# Patient Record
Sex: MALE | Race: BLACK OR AFRICAN AMERICAN | Employment: OTHER | URBAN - METROPOLITAN AREA
[De-identification: names, ages, dates, MRNs, and addresses within clinical notes are randomized per-mention and may not be internally consistent; named-entity substitution may affect disease eponyms.]

---

## 2020-07-08 ENCOUNTER — APPOINTMENT (OUTPATIENT)
Dept: GENERAL RADIOLOGY | Age: 70
End: 2020-07-08
Payer: COMMERCIAL

## 2020-07-08 ENCOUNTER — HOSPITAL ENCOUNTER (EMERGENCY)
Age: 70
Discharge: HOME | End: 2020-07-08
Payer: COMMERCIAL

## 2020-07-08 ENCOUNTER — APPOINTMENT (OUTPATIENT)
Dept: CT IMAGING | Age: 70
End: 2020-07-08
Payer: COMMERCIAL

## 2020-07-08 VITALS
RESPIRATION RATE: 19 BRPM | DIASTOLIC BLOOD PRESSURE: 84 MMHG | HEIGHT: 72 IN | OXYGEN SATURATION: 99 % | TEMPERATURE: 98.8 F | HEART RATE: 68 BPM | WEIGHT: 188.71 LBS | BODY MASS INDEX: 25.56 KG/M2 | SYSTOLIC BLOOD PRESSURE: 157 MMHG

## 2020-07-08 LAB
A/G RATIO: 0.7 (ref 1.1–2.2)
ALBUMIN SERPL-MCNC: 3.6 G/DL (ref 3.4–5)
ALP BLD-CCNC: 147 U/L (ref 40–129)
ALT SERPL-CCNC: 22 U/L (ref 10–40)
AMMONIA: 47 UMOL/L (ref 16–60)
ANION GAP SERPL CALCULATED.3IONS-SCNC: 12 MMOL/L (ref 3–16)
AST SERPL-CCNC: 44 U/L (ref 15–37)
BACTERIA: ABNORMAL /HPF
BASOPHILS ABSOLUTE: 0.1 K/UL (ref 0–0.2)
BASOPHILS RELATIVE PERCENT: 1.3 %
BILIRUB SERPL-MCNC: 0.4 MG/DL (ref 0–1)
BILIRUBIN URINE: NEGATIVE
BLOOD, URINE: NEGATIVE
BUN BLDV-MCNC: 12 MG/DL (ref 7–20)
CALCIUM SERPL-MCNC: 9 MG/DL (ref 8.3–10.6)
CHLORIDE BLD-SCNC: 98 MMOL/L (ref 99–110)
CLARITY: ABNORMAL
CO2: 21 MMOL/L (ref 21–32)
COLOR: ABNORMAL
CREAT SERPL-MCNC: 1.4 MG/DL (ref 0.8–1.3)
EKG ATRIAL RATE: 79 BPM
EKG DIAGNOSIS: NORMAL
EKG P AXIS: 59 DEGREES
EKG P-R INTERVAL: 176 MS
EKG Q-T INTERVAL: 424 MS
EKG QRS DURATION: 138 MS
EKG QTC CALCULATION (BAZETT): 486 MS
EKG R AXIS: -73 DEGREES
EKG T AXIS: 10 DEGREES
EKG VENTRICULAR RATE: 79 BPM
EOSINOPHILS ABSOLUTE: 0 K/UL (ref 0–0.6)
EOSINOPHILS RELATIVE PERCENT: 0.1 %
EPITHELIAL CELLS, UA: 1 /HPF (ref 0–5)
FINE CASTS, UA: ABNORMAL /LPF (ref 0–2)
GFR AFRICAN AMERICAN: >60
GFR NON-AFRICAN AMERICAN: 50
GLOBULIN: 5 G/DL
GLUCOSE BLD-MCNC: 102 MG/DL (ref 70–99)
GLUCOSE URINE: NEGATIVE MG/DL
HCT VFR BLD CALC: 38.5 % (ref 40.5–52.5)
HEMOGLOBIN: 12.7 G/DL (ref 13.5–17.5)
HYALINE CASTS: ABNORMAL /LPF (ref 0–2)
INR BLD: 1.22 (ref 0.86–1.14)
KETONES, URINE: ABNORMAL MG/DL
LACTIC ACID: 1.2 MMOL/L (ref 0.4–2)
LEUKOCYTE ESTERASE, URINE: NEGATIVE
LIPASE: 33 U/L (ref 13–60)
LYMPHOCYTES ABSOLUTE: 1.1 K/UL (ref 1–5.1)
LYMPHOCYTES RELATIVE PERCENT: 22.9 %
MAGNESIUM: 2.1 MG/DL (ref 1.8–2.4)
MCH RBC QN AUTO: 27.1 PG (ref 26–34)
MCHC RBC AUTO-ENTMCNC: 32.9 G/DL (ref 31–36)
MCV RBC AUTO: 82.3 FL (ref 80–100)
MICROSCOPIC EXAMINATION: YES
MONOCYTES ABSOLUTE: 1 K/UL (ref 0–1.3)
MONOCYTES RELATIVE PERCENT: 21.1 %
NEUTROPHILS ABSOLUTE: 2.6 K/UL (ref 1.7–7.7)
NEUTROPHILS RELATIVE PERCENT: 54.6 %
NITRITE, URINE: NEGATIVE
PDW BLD-RTO: 16.2 % (ref 12.4–15.4)
PH UA: 5.5 (ref 5–8)
PLATELET # BLD: 274 K/UL (ref 135–450)
PMV BLD AUTO: 7.9 FL (ref 5–10.5)
POTASSIUM SERPL-SCNC: 4 MMOL/L (ref 3.5–5.1)
PRO-BNP: 49 PG/ML (ref 0–124)
PROTEIN UA: 100 MG/DL
PROTHROMBIN TIME: 14.2 SEC (ref 10–13.2)
RBC # BLD: 4.68 M/UL (ref 4.2–5.9)
RBC UA: 5 /HPF (ref 0–4)
SODIUM BLD-SCNC: 131 MMOL/L (ref 136–145)
SPECIFIC GRAVITY UA: 1.02 (ref 1–1.03)
TOTAL PROTEIN: 8.6 G/DL (ref 6.4–8.2)
TROPONIN: <0.01 NG/ML
URINE REFLEX TO CULTURE: ABNORMAL
URINE TYPE: ABNORMAL
UROBILINOGEN, URINE: 0.2 E.U./DL
WBC # BLD: 4.7 K/UL (ref 4–11)
WBC UA: 1 /HPF (ref 0–5)

## 2020-07-08 PROCEDURE — 85610 PROTHROMBIN TIME: CPT

## 2020-07-08 PROCEDURE — 83880 ASSAY OF NATRIURETIC PEPTIDE: CPT

## 2020-07-08 PROCEDURE — 81001 URINALYSIS AUTO W/SCOPE: CPT

## 2020-07-08 PROCEDURE — 93005 ELECTROCARDIOGRAM TRACING: CPT | Performed by: PHYSICIAN ASSISTANT

## 2020-07-08 PROCEDURE — 83605 ASSAY OF LACTIC ACID: CPT

## 2020-07-08 PROCEDURE — 71045 X-RAY EXAM CHEST 1 VIEW: CPT

## 2020-07-08 PROCEDURE — 84484 ASSAY OF TROPONIN QUANT: CPT

## 2020-07-08 PROCEDURE — 70450 CT HEAD/BRAIN W/O DYE: CPT

## 2020-07-08 PROCEDURE — 83735 ASSAY OF MAGNESIUM: CPT

## 2020-07-08 PROCEDURE — 93010 ELECTROCARDIOGRAM REPORT: CPT | Performed by: INTERNAL MEDICINE

## 2020-07-08 PROCEDURE — 83690 ASSAY OF LIPASE: CPT

## 2020-07-08 PROCEDURE — 99285 EMERGENCY DEPT VISIT HI MDM: CPT

## 2020-07-08 PROCEDURE — 36415 COLL VENOUS BLD VENIPUNCTURE: CPT

## 2020-07-08 PROCEDURE — 80053 COMPREHEN METABOLIC PANEL: CPT

## 2020-07-08 PROCEDURE — 85025 COMPLETE CBC W/AUTO DIFF WBC: CPT

## 2020-07-08 PROCEDURE — 87040 BLOOD CULTURE FOR BACTERIA: CPT

## 2020-07-08 PROCEDURE — 82140 ASSAY OF AMMONIA: CPT

## 2020-07-08 RX ORDER — BISACODYL 10 MG
10 SUPPOSITORY, RECTAL RECTAL DAILY PRN
COMMUNITY

## 2020-07-08 RX ORDER — CHLORPROMAZINE HYDROCHLORIDE 25 MG/1
1 TABLET, FILM COATED ORAL 4 TIMES DAILY
COMMUNITY
Start: 2020-06-24

## 2020-07-08 RX ORDER — PANTOPRAZOLE SODIUM 20 MG/1
1 TABLET, DELAYED RELEASE ORAL DAILY
COMMUNITY
Start: 2020-07-06

## 2020-07-08 RX ORDER — SODIUM PHOSPHATE, DIBASIC AND SODIUM PHOSPHATE, MONOBASIC 7; 19 G/133ML; G/133ML
1 ENEMA RECTAL DAILY PRN
COMMUNITY

## 2020-07-08 RX ORDER — 0.9 % SODIUM CHLORIDE 0.9 %
500 INTRAVENOUS SOLUTION INTRAVENOUS ONCE
Status: DISCONTINUED | OUTPATIENT
Start: 2020-07-08 | End: 2020-07-08 | Stop reason: HOSPADM

## 2020-07-08 RX ORDER — ACETAMINOPHEN 325 MG/1
650 TABLET ORAL EVERY 6 HOURS PRN
COMMUNITY

## 2020-07-08 RX ORDER — LACTULOSE 10 G/15ML
30 SOLUTION ORAL 4 TIMES DAILY
COMMUNITY
Start: 2020-07-05

## 2020-07-08 NOTE — ED PROVIDER NOTES
SCREENINGS             PHYSICAL EXAM    (up to 7 for level 4, 8 or more for level 5)     ED Triage Vitals   BP Temp Temp src Pulse Resp SpO2 Height Weight   -- -- -- -- -- -- -- --       Physical Exam  Vitals signs and nursing note reviewed. Constitutional:       Appearance: Normal appearance. He is well-developed and normal weight. Comments: Patient easily awakens. States he has no physical complaints. States he is a bit tired. He does mentate clearly for me. He is unaware as to exact date or time. This given the fact that he lives in an [facility] this could be explained or understandable. He also has known dementia and cognitive impairment due to EtOH abuse. HENT:      Head: Normocephalic and atraumatic. Right Ear: External ear normal.      Left Ear: External ear normal.   Eyes:      General: No scleral icterus. Right eye: No discharge. Left eye: No discharge. Conjunctiva/sclera: Conjunctivae normal.   Neck:      Musculoskeletal: Normal range of motion and neck supple. Cardiovascular:      Rate and Rhythm: Normal rate and regular rhythm. Heart sounds: Normal heart sounds. Pulmonary:      Effort: Pulmonary effort is normal.      Breath sounds: Normal breath sounds. Abdominal:      General: Abdomen is flat. Bowel sounds are normal.      Palpations: Abdomen is soft. Tenderness: There is no abdominal tenderness. Musculoskeletal: Normal range of motion. Skin:     General: Skin is warm and dry. Neurological:      General: No focal deficit present. Mental Status: He is alert and oriented to person, place, and time. Mental status is at baseline.    Psychiatric:         Mood and Affect: Mood normal.         Behavior: Behavior normal.         DIAGNOSTIC RESULTS   LABS:    Labs Reviewed   CBC WITH AUTO DIFFERENTIAL - Abnormal; Notable for the following components:       Result Value    Hemoglobin 12.7 (*)     Hematocrit 38.5 (*)     RDW 16.2 (*) All other components within normal limits    Narrative:     Performed at:  [facility]  [address] [facility] [place] [address]   Phone [phone]   COMPREHENSIVE METABOLIC PANEL - Abnormal; Notable for the following components:    Sodium 131 (*)     Chloride 98 (*)     Glucose 102 (*)     CREATININE 1.4 (*)     GFR Non- 50 (*)     Total Protein 8.6 (*)     Albumin/Globulin Ratio 0.7 (*)     Alkaline Phosphatase 147 (*)     AST 44 (*)     All other components within normal limits    Narrative:     Performed at:  [address] [address]   Phone [phone]   URINE RT REFLEX TO CULTURE - Abnormal; Notable for the following components:    Clarity, UA CLOUDY (*)     Ketones, Urine TRACE (*)     Protein,  (*)     All other components within normal limits    Narrative:     Performed at:  [address] [employer]   Phone [phone]   PROTIME-INR - Abnormal; Notable for the following components:    Protime 14.2 (*)     INR 1.22 (*)     All other components within normal limits    Narrative:     Performed at:  [address] [employer]   Phone [phone]   MICROSCOPIC URINALYSIS - Abnormal; Notable for the following components:    Hyaline Casts, UA 11-20 (*)     Fine Casts, UA 11-20 (*)     Bacteria, UA 4+ (*)     RBC, UA 5 (*)     All other components within normal limits    Narrative:     Performed at:  [address] [place] [address]   Phone [phone]   CULTURE, BLOOD 2    Narrative:     ORDER#: [id number]                          ORDERED BY: Adeel Huynh  SOURCE: Blood                              COLLECTED:  07/08/20 10:05  ANTIBIOTICS AT TRICIA.:                      RECEIVED :  07/08/20 10:18  If child <=2 yrs old please draw pediatric bottle. ~Blood Culture #2  Performed at:  [facility]  [address] [place] [address]   Phone [phone]   CULTURE, BLOOD 1    Narrative:     ORDER#: [id number]                          ORDERED BY: Manoj Lane  SOURCE: Blood                              COLLECTED:  07/08/20 09:30  ANTIBIOTICS AT TRICIA.:                      RECEIVED :  07/08/20 09:38  If child <=2 yrs old please draw pediatric bottle. ~Blood Culture #1  Performed at:  [facility]  [address] [place], [place] [address]   Phone [phone]   CULTURE, URINE   LIPASE    Narrative:     Performed at:  [facility]  [address] [facility] [place] [address]   Phone [phone]   TROPONIN    Narrative:     Performed at:  [facility]  [address] [facility] [place] [address]   Phone [phone]   BRAIN NATRIURETIC PEPTIDE    Narrative:     Performed at:  [facility]  [address] [facility] [place] [address]   Phone [phone]   MAGNESIUM    Narrative:     Performed at:  [facility]  [address] [facility] [place] [address]   Phone [phone]   LACTIC ACID, PLASMA    Narrative:     Performed at:  [facility]  [address] [place], [place] [address]   Phone [phone]   AMMONIA    Narrative:     Performed at:  [facility]  [address] [facility] [place] [address]   Phone [phone]       All other labs were within normal range or not returned as of this dictation. EKG: All EKG's are interpreted by the Emergency Department Physician in the absence of a cardiologist.  Please see their note for interpretation of EKG.       RADIOLOGY:   Non-plain film images such as CT, Ultrasound and MRI are read by the radiologist. Sangeeta Chen radiographic images are visualized and preliminarily interpreted by the ED Provider with the below findings:        Interpretation per the Radiologist below, if available at the time of this note:    XR CHEST PORTABLE   Final Result   No acute process. CT Head WO Contrast   Final Result   1. No evidence of acute intracranial abnormality. 2. Abnormal lucency surrounding root of left maxillary molar with focal   disruption of adjacent cortical bone along the floor of the left maxillary   sinus. Periodontal disease and dentigerous cyst are in the differential   diagnosis. No results found. PROCEDURES   Unless otherwise noted below, none     Procedures    CRITICAL CARE TIME   N/A    CONSULTS:  None      EMERGENCY DEPARTMENT COURSE and DIFFERENTIAL DIAGNOSIS/MDM:   Vitals:    Vitals:    07/08/20 1200 07/08/20 1445 07/08/20 1530 07/08/20 1735   BP:  (!) 157/94  (!) 157/84   Pulse: 65 67 67 68   Resp: 19 17 19    Temp:       TempSrc:       SpO2:       Weight:       Height:           Patient was given the following medications:  Medications - No data to display        Patient presenting by EMS from [facility] with altered mental status. Patient remained aware and alert throughout his ED visit. I do suspect his altered mental status this morning as noted by the care facility is related to his recent Thorazine increase from 10 mg 4 times daily up to 25 mg 4 times daily. We did obtain laboratory studies, chest x-ray, CT scans and UA and these were all normal within range. He did not receive any medication while here in the emergency room. Patient is able to return back to the [facility] without prescriptions. The patient does express understanding of his diagnosis and the treatment plan. FINAL IMPRESSION      1.  Altered mental status, unspecified altered mental status type          DISPOSITION/PLAN   DISPOSITION Decision To Discharge 07/08/2020 07:02:14 PM      PATIENT REFERREDTO:  No follow-up provider specified. DISCHARGE MEDICATIONS:  Discharge Medication List as of 7/8/2020  6:59 PM          DISCONTINUED MEDICATIONS:  Discharge Medication List as of 7/8/2020  6:59 PM                 (Please note that portions of this note were completed with a voice recognition program.  Efforts were made to edit the dictations but occasionally words are mis-transcribed. )    Mi Gonzalez PA-C (electronically signed)           Mi Gonzalez PA-C  07/09/20 2176

## 2020-07-08 NOTE — ED NOTES
Bed: B-05  Expected date:   Expected time:   Means of arrival:   Comments:  70M  Altered mental status       Teresa Martinez RN  07/08/20 8861

## 2020-07-08 NOTE — ED NOTES
Pt sleeping at present  When awaken offered drink refused  Up dated     [facility]  07/08/20 1543

## 2020-07-08 NOTE — ED PROVIDER NOTES
[address] [facility]      Pt Name: Larissa Em  MRN: [id number]  [place] 1950  Date of evaluation: 7/8/2020  Provider: Merlene Mead DO    CHIEF COMPLAINT  Chief Complaint   Patient presents with    Altered Mental Status     Patient sent in from [facility] because staff had trouble waking him up this morning. Patient has dementia and is alert to person and place this morning. Patient        I have fully participated in the care of Larissa Em and have had a face-to-face evaluation. I have reviewed and agree with all pertinent clinical information, and midlevel provider's history, and physical exam. I have also reviewed the labs, EKG, and imaging studies and treatment plan. I have also reviewed and agree with the medications, allergies and past medical history section for this Larissa Em. I agree with the diagnosis, and I concur. I wore personal protective equipment when I was in the room the entire time. This includes gloves, N95 mask, face shield, and a glove over my stethoscope for protection. Past Medical History:   Diagnosis Date    Alcohol dependence with alcohol-induced persisting dementia (HCC)     Alcoholic cirrhosis of liver with ascites (HCC)     Alcoholic hepatitis without ascites     Anemia     Anxiety     Chronic hepatitis in viral disease ([address]     Cognitive communication deficit     Coordination abnormal     Dementia ([address]     GERD (gastroesophageal reflux disease)     Hypertension     Malnutrition ([facility]        MDM:  Larissa Em is a 79 y.o. male who presents with altered mental status with a history of alcoholic cirrhosis and hepatitis. He has a history of hepatic encephalopathy. Patient is confused has no other complaints. Physical exam reveals moderate confusion. It has been present for 24 hours according to the ECF.   His exam is nonfocal.  He has had similar episodes with hepatic encephalopathy. Laboratory work revealed a urinary tract infection. He also had a sodium 131 with a GFR of 50 and mildly elevated liver functions. Patient was back to his baseline at discharge. He was discharged to follow with his doctor in 3 to 5 days and return if any problems.     Vitals:    07/08/20 1735   BP: (!) 157/84   Pulse: 68   Resp:    Temp:    SpO2:        Lab results  Labs Reviewed   CBC WITH AUTO DIFFERENTIAL - Abnormal; Notable for the following components:       Result Value    Hemoglobin 12.7 (*)     Hematocrit 38.5 (*)     RDW 16.2 (*)     All other components within normal limits    Narrative:     Performed at:  [address] [employer]   Phone [phone]   COMPREHENSIVE METABOLIC PANEL - Abnormal; Notable for the following components:    Sodium 131 (*)     Chloride 98 (*)     Glucose 102 (*)     CREATININE 1.4 (*)     GFR Non- 50 (*)     Total Protein 8.6 (*)     Albumin/Globulin Ratio 0.7 (*)     Alkaline Phosphatase 147 (*)     AST 44 (*)     All other components within normal limits    Narrative:     Performed at:  [address] [facility] [place] [address]   Phone [phone]   URINE RT REFLEX TO CULTURE - Abnormal; Notable for the following components:    Clarity, UA CLOUDY (*)     Ketones, Urine TRACE (*)     Protein,  (*)     All other components within normal limits    Narrative:     Performed at:  [facility]  [employer] [place] [facility] [place] [address]   Phone [phone]   PROTIME-INR - Abnormal; Notable for the following components:    Protime 14.2 (*)     INR 1.22 (*)     All other components within normal limits    Narrative:     Performed at:  [facility]  [employer] [place] [place] [facility] [place] [address]   Phone [phone]   MICROSCOPIC URINALYSIS - Abnormal; Notable for the following components:    Hyaline Casts, UA 11-20 (*)     Fine Casts, UA 11-20 (*)     Bacteria, UA 4+ (*)     RBC, UA 5 (*)     All other components within normal limits    Narrative:     Performed at:  [facility]  [address] [facility] [place] [address]   Phone [phone]   CULTURE, BLOOD 2    Narrative:     ORDER#: [id number]                          ORDERED BY: Amy Zapata  SOURCE: Blood                              COLLECTED:  07/08/20 10:05  ANTIBIOTICS AT TRICIA.:                      RECEIVED :  07/08/20 10:18  If child <=2 yrs old please draw pediatric bottle. ~Blood Culture #2  Performed at:  [facility]  [address] [facility] [place] [address]   Phone [phone]   CULTURE, BLOOD 1    Narrative:     ORDER#: [id number]                          ORDERED BY: Amy Zapata  SOURCE: Blood                              COLLECTED:  07/08/20 09:30  ANTIBIOTICS AT TRICIA.:                      RECEIVED :  07/08/20 09:38  If child <=2 yrs old please draw pediatric bottle. ~Blood Culture #1  Performed at:  [facility]  [address] [place], [place] [address]   Phone [phone]   CULTURE, URINE   LIPASE    Narrative:     Performed at:  [facility]  [address] [facility] [place] [address]   Phone [phone]   TROPONIN    Narrative:     Performed at:  [facility] [facility]  [address] [facility] [place] [address]   Phone [phone]   BRAIN NATRIURETIC PEPTIDE    Narrative:     Performed at:  [facility]  [address] [facility] [place] [address]   Phone [phone]   MAGNESIUM    Narrative:     Performed at:  [facility] [employer] [facility]  [address] [facility] [place] [address]   Phone [phone]   LACTIC ACID, PLASMA    Narrative:     Performed at:  [facility]  [address] [place], [place] [address]   Phone [phone]   AMMONIA    Narrative:     Performed at:  [facility]  [address] [place], [place] [address]   Phone [phone]       EKG Results  EKG Interpretation    Interpreted by emergency department physician  Time read: 4812    Rhythm: Sinus  Ventricular Rate: 79  QRS Axis: -73  Ectopy: None  Conduction: Sinus rhythm with left anterior fascicular block and right bundle branch block, left axis deviation and LVH  ST Segments: Consistent with bifascicular block  T Waves: Consistent with bifascicular block  Q Waves: None noted    Other findings: Electrical interference    Compared to EKG on: None to compare    Clinical Impression: Sinus rhythm with left anterior fascicular block and right bundle branch block with ST-T wave changes consistent with bifascicular block. There is also left axis deviation and LVH. [address]      Radiology results  XR CHEST PORTABLE   Final Result   No acute process. CT Head WO Contrast   Final Result   1. No evidence of acute intracranial abnormality. 2. Abnormal lucency surrounding root of left maxillary molar with focal   disruption of adjacent cortical bone along the floor of the left maxillary   sinus. Periodontal disease and dentigerous cyst are in the differential   diagnosis. See discharge instructions for specific medications, discharge information, and treatments. They were verbally instructed to return to emergency if any problems. Medications - No data to display    Discharge Medication List as of 7/8/2020  6:59 PM          The patient's blood pressure was not found to be elevated according to CMS/Medicare and the Affordable Care Act/ObamaCare criteria. IMPRESSIONS:  1.  Altered mental status, unspecified altered mental status type                 Myriam Talavera DO  07/12/20 0591

## 2020-07-12 LAB
BLOOD CULTURE, ROUTINE: NORMAL
CULTURE, BLOOD 2: NORMAL

## 2020-07-24 ENCOUNTER — APPOINTMENT (OUTPATIENT)
Dept: GENERAL RADIOLOGY | Age: 70
DRG: 177 | End: 2020-07-24
Payer: COMMERCIAL

## 2020-07-24 ENCOUNTER — APPOINTMENT (OUTPATIENT)
Dept: CT IMAGING | Age: 70
DRG: 177 | End: 2020-07-24
Payer: COMMERCIAL

## 2020-07-24 ENCOUNTER — HOSPITAL ENCOUNTER (INPATIENT)
Age: 70
LOS: 6 days | Discharge: NURSING, REHAB OR LONG-TERM CARE | DRG: 177 | End: 2020-07-30
Attending: STUDENT IN AN ORGANIZED HEALTH CARE EDUCATION/TRAINING PROGRAM | Admitting: HOSPITALIST
Payer: COMMERCIAL

## 2020-07-24 PROBLEM — R41.82 CHANGE IN MENTAL STATUS: Status: ACTIVE | Noted: 2020-07-24

## 2020-07-24 LAB
A/G RATIO: 0.5 (ref 1.1–2.2)
ALBUMIN SERPL-MCNC: 3.5 G/DL (ref 3.4–5)
ALP BLD-CCNC: 105 U/L (ref 40–129)
ALT SERPL-CCNC: 24 U/L (ref 10–40)
AMMONIA: 44 UMOL/L (ref 16–60)
ANION GAP SERPL CALCULATED.3IONS-SCNC: 15 MMOL/L (ref 3–16)
AST SERPL-CCNC: 55 U/L (ref 15–37)
BASE EXCESS VENOUS: -2.6 MMOL/L
BASOPHILS ABSOLUTE: 0 K/UL (ref 0–0.2)
BASOPHILS RELATIVE PERCENT: 0.2 %
BILIRUB SERPL-MCNC: 0.7 MG/DL (ref 0–1)
BILIRUBIN URINE: NEGATIVE
BLOOD, URINE: NEGATIVE
BUN BLDV-MCNC: 47 MG/DL (ref 7–20)
CALCIUM SERPL-MCNC: 9.2 MG/DL (ref 8.3–10.6)
CARBOXYHEMOGLOBIN: 0.6 %
CHLORIDE BLD-SCNC: 118 MMOL/L (ref 99–110)
CLARITY: CLEAR
CO2: 19 MMOL/L (ref 21–32)
COLOR: ABNORMAL
CREAT SERPL-MCNC: 1.3 MG/DL (ref 0.8–1.3)
EOSINOPHILS ABSOLUTE: 0.1 K/UL (ref 0–0.6)
EOSINOPHILS RELATIVE PERCENT: 0.9 %
EPITHELIAL CELLS, UA: 0 /HPF (ref 0–5)
GFR AFRICAN AMERICAN: >60
GFR NON-AFRICAN AMERICAN: 55
GLOBULIN: 6.5 G/DL
GLUCOSE BLD-MCNC: 112 MG/DL (ref 70–99)
GLUCOSE URINE: NEGATIVE MG/DL
HCO3 VENOUS: 21 MMOL/L (ref 23–29)
HCT VFR BLD CALC: 43.2 % (ref 40.5–52.5)
HEMOGLOBIN: 13.7 G/DL (ref 13.5–17.5)
HYALINE CASTS: 3 /LPF (ref 0–8)
KETONES, URINE: NEGATIVE MG/DL
LEUKOCYTE ESTERASE, URINE: NEGATIVE
LYMPHOCYTES ABSOLUTE: 0.9 K/UL (ref 1–5.1)
LYMPHOCYTES RELATIVE PERCENT: 10.8 %
MAGNESIUM: 3.6 MG/DL (ref 1.8–2.4)
MCH RBC QN AUTO: 27.3 PG (ref 26–34)
MCHC RBC AUTO-ENTMCNC: 31.7 G/DL (ref 31–36)
MCV RBC AUTO: 86 FL (ref 80–100)
METHEMOGLOBIN VENOUS: 0.3 %
MICROSCOPIC EXAMINATION: YES
MONOCYTES ABSOLUTE: 0.8 K/UL (ref 0–1.3)
MONOCYTES RELATIVE PERCENT: 9.5 %
NEUTROPHILS ABSOLUTE: 6.8 K/UL (ref 1.7–7.7)
NEUTROPHILS RELATIVE PERCENT: 78.6 %
NITRITE, URINE: NEGATIVE
O2 CONTENT, VEN: 18 ML/DL
O2 SAT, VEN: 94 %
O2 THERAPY: ABNORMAL
PCO2, VEN: 33.3 MMHG (ref 40–50)
PDW BLD-RTO: 15.8 % (ref 12.4–15.4)
PH UA: 5 (ref 5–8)
PH VENOUS: 7.41 (ref 7.35–7.45)
PHOSPHORUS: 3.6 MG/DL (ref 2.5–4.9)
PLATELET # BLD: 473 K/UL (ref 135–450)
PMV BLD AUTO: 7.9 FL (ref 5–10.5)
PO2, VEN: 69 MMHG
POTASSIUM REFLEX MAGNESIUM: 4.8 MMOL/L (ref 3.5–5.1)
PROCALCITONIN: 0.14 NG/ML (ref 0–0.15)
PROTEIN UA: 30 MG/DL
RBC # BLD: 5.03 M/UL (ref 4.2–5.9)
RBC UA: 2 /HPF (ref 0–4)
SODIUM BLD-SCNC: 152 MMOL/L (ref 136–145)
SPECIFIC GRAVITY UA: 1.02 (ref 1–1.03)
TCO2 CALC VENOUS: 22 MMOL/L
TOTAL PROTEIN: 10 G/DL (ref 6.4–8.2)
TROPONIN: <0.01 NG/ML
URINE REFLEX TO CULTURE: ABNORMAL
URINE TYPE: ABNORMAL
UROBILINOGEN, URINE: 0.2 E.U./DL
WBC # BLD: 8.6 K/UL (ref 4–11)
WBC UA: 1 /HPF (ref 0–5)

## 2020-07-24 PROCEDURE — 2580000003 HC RX 258: Performed by: HOSPITALIST

## 2020-07-24 PROCEDURE — 70450 CT HEAD/BRAIN W/O DYE: CPT

## 2020-07-24 PROCEDURE — 82140 ASSAY OF AMMONIA: CPT

## 2020-07-24 PROCEDURE — 2580000003 HC RX 258: Performed by: STUDENT IN AN ORGANIZED HEALTH CARE EDUCATION/TRAINING PROGRAM

## 2020-07-24 PROCEDURE — 80053 COMPREHEN METABOLIC PANEL: CPT

## 2020-07-24 PROCEDURE — 81001 URINALYSIS AUTO W/SCOPE: CPT

## 2020-07-24 PROCEDURE — 83735 ASSAY OF MAGNESIUM: CPT

## 2020-07-24 PROCEDURE — 96360 HYDRATION IV INFUSION INIT: CPT

## 2020-07-24 PROCEDURE — 6370000000 HC RX 637 (ALT 250 FOR IP): Performed by: HOSPITALIST

## 2020-07-24 PROCEDURE — 99285 EMERGENCY DEPT VISIT HI MDM: CPT

## 2020-07-24 PROCEDURE — 82803 BLOOD GASES ANY COMBINATION: CPT

## 2020-07-24 PROCEDURE — 36415 COLL VENOUS BLD VENIPUNCTURE: CPT

## 2020-07-24 PROCEDURE — 84484 ASSAY OF TROPONIN QUANT: CPT

## 2020-07-24 PROCEDURE — 85025 COMPLETE CBC W/AUTO DIFF WBC: CPT

## 2020-07-24 PROCEDURE — 84145 PROCALCITONIN (PCT): CPT

## 2020-07-24 PROCEDURE — 84100 ASSAY OF PHOSPHORUS: CPT

## 2020-07-24 PROCEDURE — 71045 X-RAY EXAM CHEST 1 VIEW: CPT

## 2020-07-24 PROCEDURE — 1200000000 HC SEMI PRIVATE

## 2020-07-24 RX ORDER — POTASSIUM CHLORIDE 7.45 MG/ML
10 INJECTION INTRAVENOUS PRN
Status: DISCONTINUED | OUTPATIENT
Start: 2020-07-24 | End: 2020-07-30 | Stop reason: HOSPADM

## 2020-07-24 RX ORDER — ACETAMINOPHEN 325 MG/1
650 TABLET ORAL EVERY 6 HOURS PRN
Status: DISCONTINUED | OUTPATIENT
Start: 2020-07-24 | End: 2020-07-30 | Stop reason: HOSPADM

## 2020-07-24 RX ORDER — ACETAMINOPHEN 650 MG/1
650 SUPPOSITORY RECTAL EVERY 6 HOURS PRN
Status: DISCONTINUED | OUTPATIENT
Start: 2020-07-24 | End: 2020-07-30 | Stop reason: HOSPADM

## 2020-07-24 RX ORDER — FAMOTIDINE 20 MG/1
20 TABLET, FILM COATED ORAL 2 TIMES DAILY
Status: DISCONTINUED | OUTPATIENT
Start: 2020-07-24 | End: 2020-07-24 | Stop reason: ALTCHOICE

## 2020-07-24 RX ORDER — ZOLPIDEM TARTRATE 5 MG/1
5 TABLET ORAL NIGHTLY PRN
Status: DISCONTINUED | OUTPATIENT
Start: 2020-07-24 | End: 2020-07-27

## 2020-07-24 RX ORDER — 0.9 % SODIUM CHLORIDE 0.9 %
1000 INTRAVENOUS SOLUTION INTRAVENOUS ONCE
Status: COMPLETED | OUTPATIENT
Start: 2020-07-24 | End: 2020-07-24

## 2020-07-24 RX ORDER — LACTULOSE 10 G/15ML
30 SOLUTION ORAL 4 TIMES DAILY
Status: DISCONTINUED | OUTPATIENT
Start: 2020-07-24 | End: 2020-07-30 | Stop reason: HOSPADM

## 2020-07-24 RX ORDER — POTASSIUM CHLORIDE 20 MEQ/1
40 TABLET, EXTENDED RELEASE ORAL PRN
Status: DISCONTINUED | OUTPATIENT
Start: 2020-07-24 | End: 2020-07-30 | Stop reason: HOSPADM

## 2020-07-24 RX ORDER — PROMETHAZINE HYDROCHLORIDE 25 MG/1
12.5 TABLET ORAL EVERY 6 HOURS PRN
Status: DISCONTINUED | OUTPATIENT
Start: 2020-07-24 | End: 2020-07-30 | Stop reason: HOSPADM

## 2020-07-24 RX ORDER — BISACODYL 10 MG
10 SUPPOSITORY, RECTAL RECTAL DAILY PRN
Status: DISCONTINUED | OUTPATIENT
Start: 2020-07-24 | End: 2020-07-30 | Stop reason: HOSPADM

## 2020-07-24 RX ORDER — CHLORPROMAZINE HYDROCHLORIDE 25 MG/1
25 TABLET, FILM COATED ORAL 4 TIMES DAILY
Status: DISCONTINUED | OUTPATIENT
Start: 2020-07-24 | End: 2020-07-30 | Stop reason: HOSPADM

## 2020-07-24 RX ORDER — SODIUM PHOSPHATE, DIBASIC AND SODIUM PHOSPHATE, MONOBASIC 7; 19 G/133ML; G/133ML
1 ENEMA RECTAL DAILY PRN
Status: DISCONTINUED | OUTPATIENT
Start: 2020-07-24 | End: 2020-07-30 | Stop reason: HOSPADM

## 2020-07-24 RX ORDER — SODIUM CHLORIDE 9 MG/ML
INJECTION, SOLUTION INTRAVENOUS CONTINUOUS
Status: DISCONTINUED | OUTPATIENT
Start: 2020-07-24 | End: 2020-07-25

## 2020-07-24 RX ORDER — SODIUM CHLORIDE 0.9 % (FLUSH) 0.9 %
10 SYRINGE (ML) INJECTION PRN
Status: DISCONTINUED | OUTPATIENT
Start: 2020-07-24 | End: 2020-07-30 | Stop reason: HOSPADM

## 2020-07-24 RX ORDER — SODIUM CHLORIDE 0.9 % (FLUSH) 0.9 %
10 SYRINGE (ML) INJECTION EVERY 12 HOURS SCHEDULED
Status: DISCONTINUED | OUTPATIENT
Start: 2020-07-24 | End: 2020-07-30 | Stop reason: HOSPADM

## 2020-07-24 RX ORDER — PANTOPRAZOLE SODIUM 20 MG/1
20 TABLET, DELAYED RELEASE ORAL
Status: DISCONTINUED | OUTPATIENT
Start: 2020-07-25 | End: 2020-07-28

## 2020-07-24 RX ORDER — POLYETHYLENE GLYCOL 3350 17 G/17G
17 POWDER, FOR SOLUTION ORAL DAILY PRN
Status: DISCONTINUED | OUTPATIENT
Start: 2020-07-24 | End: 2020-07-30 | Stop reason: HOSPADM

## 2020-07-24 RX ORDER — ONDANSETRON 2 MG/ML
4 INJECTION INTRAMUSCULAR; INTRAVENOUS EVERY 6 HOURS PRN
Status: DISCONTINUED | OUTPATIENT
Start: 2020-07-24 | End: 2020-07-30 | Stop reason: HOSPADM

## 2020-07-24 RX ADMIN — LACTULOSE 20 G: 20 SOLUTION ORAL at 21:21

## 2020-07-24 RX ADMIN — ACETAMINOPHEN 650 MG: 325 TABLET ORAL at 21:21

## 2020-07-24 RX ADMIN — ZOLPIDEM TARTRATE 5 MG: 5 TABLET ORAL at 21:22

## 2020-07-24 RX ADMIN — SODIUM CHLORIDE 1000 ML: 9 INJECTION, SOLUTION INTRAVENOUS at 13:57

## 2020-07-24 RX ADMIN — CHLORPROMAZINE HYDROCHLORIDE 25 MG: 25 TABLET, SUGAR COATED ORAL at 23:45

## 2020-07-24 RX ADMIN — SODIUM CHLORIDE: 9 INJECTION, SOLUTION INTRAVENOUS at 17:11

## 2020-07-24 ASSESSMENT — PAIN DESCRIPTION - LOCATION: LOCATION: GENERALIZED

## 2020-07-24 ASSESSMENT — PAIN SCALES - GENERAL
PAINLEVEL_OUTOF10: 0
PAINLEVEL_OUTOF10: 0
PAINLEVEL_OUTOF10: 3
PAINLEVEL_OUTOF10: 0

## 2020-07-24 ASSESSMENT — PAIN - FUNCTIONAL ASSESSMENT: PAIN_FUNCTIONAL_ASSESSMENT: ACTIVITIES ARE NOT PREVENTED

## 2020-07-24 ASSESSMENT — PAIN DESCRIPTION - FREQUENCY: FREQUENCY: INTERMITTENT

## 2020-07-24 ASSESSMENT — PAIN DESCRIPTION - DESCRIPTORS: DESCRIPTORS: ACHING

## 2020-07-24 ASSESSMENT — PAIN DESCRIPTION - ONSET: ONSET: GRADUAL

## 2020-07-24 ASSESSMENT — PAIN DESCRIPTION - PROGRESSION: CLINICAL_PROGRESSION: GRADUALLY IMPROVING

## 2020-07-24 NOTE — PROGRESS NOTES
Pharmacy Medication Reconciliation Note      List of medications patient is currently taking is complete. Source of information:   1. [facility] medication records/conversation with his nurse.   2. Epic records     Notes regarding home medications:   Patient received chlorpromazine (1 of 4), protonix, and lactulose prior to arrival in the ED       Denies taking any other OTC or herbal medications    Donaldo Tubbs Pharmacy Student  07/24/20 3:48 PM

## 2020-07-24 NOTE — PROGRESS NOTES
Patient arrived to room 4279 from the ED. Patient alert and oriented to self only. Vitals are stable. Oriented patient to room and call light. Call light within reach. Bed in lowest position with bed alarm on. Non-skid socks were placed on patient. Tele-monitor placed on patient. Patient attempted to get up by self after reinforcing safety multiple times. Tele camera placed in room. Will monitor.

## 2020-07-24 NOTE — ED PROVIDER NOTES
Primary Care Physician: No primary care provider on file. Attending Physician: Rebecca Luo MD     History   Chief Complaint   Patient presents with    Failure To Thrive     dx with COVID 7/18, dementia        HPI   Rosalva Bhatt is a 79 y.o. male with history of dementia, alcohol abuse with liver cirrhosis, hypertension, anxiety, anemia, malnutrition, resident of a skilled nursing facility presents this afternoon with complaint of weakness, unable to eat and concern for malnutrition. He denies any fevers, chills, nausea, vomiting, chest pain, shortness of breath or leg swelling. No symptoms associated with systemic infection. He was recently tested positive for Kovic 19 on the 18th of this month. But he denies any cough or URI symptoms. Past Medical History:   Diagnosis Date    Alcohol dependence with alcohol-induced persisting dementia (HCC)     Alcoholic cirrhosis of liver with ascites (HCC)     Alcoholic hepatitis without ascites     Anemia     Anxiety     [facility] [facility]     Cognitive communication deficit     Coordination abnormal     Dementia ([facility] [facility]     GERD (gastroesophageal reflux disease)     Hypertension     Malnutrition ([facility] [address] [facility]         History reviewed. No pertinent surgical history. No family history on file.      Social History     Socioeconomic History    Marital status: Single     Spouse name: None    Number of children: None    Years of education: None    Highest education level: None   Occupational History    None   Social Needs    Financial resource strain: None    Food insecurity     Worry: None     Inability: None    Transportation needs     Medical: None     Non-medical: None   Tobacco Use    Smoking status: Unknown If Ever Smoked   Substance and Sexual Activity    Alcohol use: Not Currently    Drug use: Not Currently    Sexual activity: None   Lifestyle    Physical activity     Days per week: None     Minutes per session: None  Stress: None   Relationships    Social connections     Talks on phone: None     Gets together: None     Attends [employer] service: None     Active member of club or organization: None     Attends meetings of clubs or organizations: None     Relationship status: None    Intimate partner violence     Fear of current or ex partner: None     Emotionally abused: None     Physically abused: None     Forced sexual activity: None   Other Topics Concern    None   Social History Narrative    None        Review of Systems   10 total systems reviewed and found to be negative unless otherwise noted in HPI     Physical Exam   /64   Pulse 85   Temp 97.9 °F (36.6 °C) (Oral)   Resp 18   Wt 191 lb 2.2 oz (86.7 kg)   SpO2 93%   BMI 25.92 kg/m²      CONSTITUTIONAL: confused but following commands  HEAD: atraumatic, normocephalic   EYES: PERRL, No injection, discharge or scleral icterus. ENT: Moist mucous membranes. NECK: Normal ROM, NO LAD   CARDIOVASCULAR: Regular rate and rhythm. No murmurs or gallop. PULMONARY/CHEST: Airway patent. No retractions. Breath sounds clear with good air entry bilaterally. ABDOMEN: Soft, Non-distended and non-tender, without guarding or rebound. SKIN: Acyanotic, warm, dry   MUSCULOSKELETAL: No swelling, tenderness or deformity   NEUROLOGICAL: Awake and alert. Pulses intact. Grossly nonfocal   Nursing note and vitals reviewed.      ED Course & Medical Decision Making   Medications   acetaminophen (TYLENOL) tablet 650 mg (has no administration in time range)   bisacodyl (DULCOLAX) suppository 10 mg (has no administration in time range)   chlorproMAZINE (THORAZINE) tablet 25 mg (25 mg Oral Given 7/24/20 2345)   lactulose (CHRONULAC) 10 GM/15ML solution 20 g (20 g Oral Given 7/24/20 2121)   pantoprazole (PROTONIX) tablet 20 mg (20 mg Oral Given 7/25/20 0706)   fleet rectal enema 1 enema (has no administration in time range)   0.9 % sodium chloride infusion ( Intravenous New Bag TO MG FOR LOW K   CBC   ETHANOL   URINE DRUG SCREEN   FERRITIN   C-REACTIVE PROTEIN   SEDIMENTATION RATE      XR CHEST PORTABLE   Final Result   New bilateral ground-glass and interstitial opacities. The appearance is   most consistent with atypical/viral pneumonia         CT Head WO Contrast   Final Result   No acute intracranial abnormality. Ct Head Wo Contrast Result Date: 7/8/2020  EXAMINATION: CT OF THE HEAD WITHOUT CONTRAST  7/8/2020 8:15 am TECHNIQUE: CT of the head was performed without the administration of intravenous contrast. Dose modulation, iterative reconstruction, and/or weight based adjustment of the mA/kV was utilized to reduce the radiation dose to as low as reasonably achievable. COMPARISON: None. HISTORY: ORDERING SYSTEM PROVIDED HISTORY: Altered mental status TECHNOLOGIST PROVIDED HISTORY: If patient is on cardiac monitor and/or pulse ox, they may be taken off cardiac monitor and pulse ox, left on O2 if currently on. All monitors reattached when patient returns to room. Has a \"code stroke\" or \"stroke alert\" been called? ->No Reason for exam:->Altered mental status Reason for Exam: Altered Mental Status (Patient sent in from [facility] because staff had trouble waking him up this morning. Patient has dementia and is alert to person and place this morning. Patient ) Acuity: Acute Type of Exam: Initial FINDINGS: BRAIN/VENTRICLES: There is mild prominence of the ventricular system. No evidence of mass effect or midline shift. Prominence of sulci overlying convexities of cerebral hemispheres and cerebellum consistent with atrophy. Mild-moderate abnormal low attenuation identified within periventricular/subcortical white matter and centrosylvian regions. Finding is nonspecific however in part likely on the basis of changes of ischemic leukoencephalopathy. No other area of abnormal attenuation of brain parenchyma is identified.   No abnormal extra-axial fluid collections are identified. There is minimal atherosclerotic calcification of distal internal carotid and vertebral arteries. ORBITS: The visualized portion of the orbits demonstrate no acute abnormality. There is evidence of prior left-sided cataract surgery. SINUSES: Minimal mucosal thickening identified within several ethmoid air cells and sphenoid locules. There is mild mucosal thickening within the left maxillary sinus. 2.5 cm ovoid lesion in the inferior left maxillary sinus could represent mucous retention cyst or polyp. Although incompletely visualized, there is suggestion of abnormal lucency surrounding the root of a left maxillary molar. Finding could be on the basis periodontal disease or dentigerous cyst.  As visualized on coronal images, there is focal area of cortical disruption along the floor of the left maxillary sinus in the area of lucency surrounding the tooth root (coronal image 31). SOFT TISSUES/SKULL:  No acute abnormality of the visualized skull or soft tissues. 1. No evidence of acute intracranial abnormality. 2. Abnormal lucency surrounding root of left maxillary molar with focal disruption of adjacent cortical bone along the floor of the left maxillary sinus. Periodontal disease and dentigerous cyst are in the differential diagnosis. Xr Chest Portable    Result Date: 7/8/2020  EXAMINATION: ONE XRAY VIEW OF THE CHEST 7/8/2020 8:16 am COMPARISON: None. HISTORY: ORDERING SYSTEM PROVIDED HISTORY: SOB TECHNOLOGIST PROVIDED HISTORY: Reason for exam:->SOB Reason for Exam: sob Acuity: Acute Type of Exam: Initial FINDINGS: The lungs are without acute focal process. There is no effusion or pneumothorax. The cardiomediastinal silhouette is without acute process. The osseous structures are without acute process. No acute process.      PROCEDURES:   Procedures    ASSESSMENT AND PLAN:  Gayla Sanchez is a 79 y.o. male with history of dementia, recently tested positive for Kovic 19, resident of skilled nursing facility presenting this afternoon per nursing facility with increased confusion unable to tolerate p.o. Exam patient is confused at baseline but following some commands moving all extremities and neurologically intact . no signs of infection and hemodynamic stable. In his presentation I did obtain labs to rule out any infectious causes of his symptoms as well a central lesion causes. Labs showed no signs of infection but elevated sodium of 152. X-ray showed no acute findings. CT of the head unremarkable. At this point I believe that his altered mental status is probably secondary to hypernatremia a result of dehydration from complication from dementia. I am recommending free water and admission for further treatment. Hospitalist was consulted and patient was admitted to their service for further treatment including free water. clINICAL IMPRESSION:  1. Hypernatremia    2. Dehydration        DISPOSITION    Hospitalist admit   -Findings and recommendations explained to patient. He expressed understanding and agreed with the plan.   Mu Stokes MD (electronically signed)  7/25/2020  _________________________________________________________________________________________  Amount and/or Complexity of Data Reviewed:  Clinical lab tests: ordered and reviewed   Tests in the radiology section of CPT®: ordered and reviewed   Tests in the medicine section of CPT®: ordered and reviewed   Decide to obtain previous medical records or to obtain history from someone other than the patient: yes  Obtain history from someone other than the patient: yes  Review and summarize past medical records:yes  I looked up the patient in our electronic medical record:yes  Discuss the patient with other providers:yes  Independent visualization of images, tracings, or specimens:yes  Risk of Complications, Morbidity, and/or Mortality:Moderate  Presenting problems: moderate  Management options: moderate _________________________________________________________________________________________  This record is transcribed utilizing voice recognition technology. There are inherent limitations in this technology. In addition, there may be limitations in editing of this report. If there are any discrepancies, please contact me directly.         Marisel Choi MD  07/25/20 8045

## 2020-07-24 NOTE — ED NOTES
Bed: -09  Expected date:   Expected time:   Means of arrival: [facility]  Comments:  Stan Rodriguez RN  07/24/20 3347

## 2020-07-24 NOTE — ED NOTES
Fall risk screening completed. Fall risk bracelet applied to patient. Non-skid socks provided and placed on patient. The fall risk is indicated using  dome light . Based on score, a bed alarm was indicated and applied. The call light is within the patient's reach, and instructions for use were provided. The bed is in the lowest position with wheels locked. The patient has been advised to notify staff, using the call light, if there is a need to get up or use restroom. The patient verbalized understanding of safety precautions and how to contact staff for assistance.         Maximino Oh RN  07/24/20 8053

## 2020-07-24 NOTE — PROGRESS NOTES
4 Eyes Skin Assessment     The patient is being assess for  Admission    I agree that 2 RN's have performed a thorough Head to Toe Skin Assessment on the patient. ALL assessment sites listed below have been assessed. Areas assessed by both nurses: Yes, Tate Medina and Yumiko Dvorište  [x]   Head, Face, and Ears   [x]   Shoulders, Back, and Chest  [x]   Arms, Elbows, and Hands   [x]   Coccyx, Sacrum, and IschIum  [x]   Legs, Feet, and Heels        Does the Patient have Skin Breakdown?   No         Nasir Prevention initiated:  Yes   Wound Care Orders initiated:  No      WOC nurse consulted for Pressure Injury (Stage 3,4, Unstageable, DTI, NWPT, and Complex wounds), New and Established Ostomies:  No      Nurse 1 eSignature: Electronically signed by Kingsley Loomis RN on 7/24/20 at 7:43 PM EDT    **SHARE this note so that the co-signing nurse is able to place an eSignature**    Nurse 2 eSignature: Electronically signed by Yuriy Umaña RN on 7/24/20 at 8:12 PM EDT

## 2020-07-25 LAB
ANION GAP SERPL CALCULATED.3IONS-SCNC: 13 MMOL/L (ref 3–16)
BUN BLDV-MCNC: 34 MG/DL (ref 7–20)
C-REACTIVE PROTEIN: 33 MG/L (ref 0–5.1)
CALCIUM SERPL-MCNC: 8.8 MG/DL (ref 8.3–10.6)
CHLORIDE BLD-SCNC: 123 MMOL/L (ref 99–110)
CO2: 20 MMOL/L (ref 21–32)
CREAT SERPL-MCNC: 1.1 MG/DL (ref 0.8–1.3)
ETHANOL: NORMAL MG/DL (ref 0–0.08)
FERRITIN: 294.6 NG/ML (ref 30–400)
GFR AFRICAN AMERICAN: >60
GFR NON-AFRICAN AMERICAN: >60
GLUCOSE BLD-MCNC: 97 MG/DL (ref 70–99)
HCT VFR BLD CALC: 42.8 % (ref 40.5–52.5)
HEMOGLOBIN: 13.3 G/DL (ref 13.5–17.5)
MCH RBC QN AUTO: 26.9 PG (ref 26–34)
MCHC RBC AUTO-ENTMCNC: 31.2 G/DL (ref 31–36)
MCV RBC AUTO: 86.4 FL (ref 80–100)
PDW BLD-RTO: 16.1 % (ref 12.4–15.4)
PLATELET # BLD: 411 K/UL (ref 135–450)
PMV BLD AUTO: 8.3 FL (ref 5–10.5)
POTASSIUM REFLEX MAGNESIUM: 4.6 MMOL/L (ref 3.5–5.1)
RBC # BLD: 4.95 M/UL (ref 4.2–5.9)
SEDIMENTATION RATE, ERYTHROCYTE: 83 MM/HR (ref 0–20)
SODIUM BLD-SCNC: 156 MMOL/L (ref 136–145)
WBC # BLD: 5.3 K/UL (ref 4–11)

## 2020-07-25 PROCEDURE — 94761 N-INVAS EAR/PLS OXIMETRY MLT: CPT

## 2020-07-25 PROCEDURE — 80048 BASIC METABOLIC PNL TOTAL CA: CPT

## 2020-07-25 PROCEDURE — 2580000003 HC RX 258: Performed by: INTERNAL MEDICINE

## 2020-07-25 PROCEDURE — 2580000003 HC RX 258: Performed by: HOSPITALIST

## 2020-07-25 PROCEDURE — 82728 ASSAY OF FERRITIN: CPT

## 2020-07-25 PROCEDURE — G0480 DRUG TEST DEF 1-7 CLASSES: HCPCS

## 2020-07-25 PROCEDURE — 6360000002 HC RX W HCPCS: Performed by: HOSPITALIST

## 2020-07-25 PROCEDURE — 1200000000 HC SEMI PRIVATE

## 2020-07-25 PROCEDURE — 85027 COMPLETE CBC AUTOMATED: CPT

## 2020-07-25 PROCEDURE — 36415 COLL VENOUS BLD VENIPUNCTURE: CPT

## 2020-07-25 PROCEDURE — 6370000000 HC RX 637 (ALT 250 FOR IP): Performed by: HOSPITALIST

## 2020-07-25 PROCEDURE — 85652 RBC SED RATE AUTOMATED: CPT

## 2020-07-25 PROCEDURE — 86140 C-REACTIVE PROTEIN: CPT

## 2020-07-25 RX ORDER — DEXTROSE MONOHYDRATE 50 MG/ML
INJECTION, SOLUTION INTRAVENOUS CONTINUOUS
Status: DISCONTINUED | OUTPATIENT
Start: 2020-07-25 | End: 2020-07-28

## 2020-07-25 RX ADMIN — DEXTROSE MONOHYDRATE: 50 INJECTION, SOLUTION INTRAVENOUS at 21:08

## 2020-07-25 RX ADMIN — LACTULOSE 20 G: 20 SOLUTION ORAL at 08:47

## 2020-07-25 RX ADMIN — LACTULOSE 20 G: 20 SOLUTION ORAL at 21:08

## 2020-07-25 RX ADMIN — DEXTROSE MONOHYDRATE: 50 INJECTION, SOLUTION INTRAVENOUS at 11:13

## 2020-07-25 RX ADMIN — CHLORPROMAZINE HYDROCHLORIDE 25 MG: 25 TABLET, SUGAR COATED ORAL at 21:08

## 2020-07-25 RX ADMIN — SODIUM CHLORIDE: 9 INJECTION, SOLUTION INTRAVENOUS at 10:19

## 2020-07-25 RX ADMIN — CEFTRIAXONE 1 G: 1 INJECTION, POWDER, FOR SOLUTION INTRAMUSCULAR; INTRAVENOUS at 03:13

## 2020-07-25 RX ADMIN — CHLORPROMAZINE HYDROCHLORIDE 25 MG: 25 TABLET, SUGAR COATED ORAL at 13:40

## 2020-07-25 RX ADMIN — CHLORPROMAZINE HYDROCHLORIDE 25 MG: 25 TABLET, SUGAR COATED ORAL at 08:46

## 2020-07-25 RX ADMIN — AZITHROMYCIN MONOHYDRATE 500 MG: 500 INJECTION, POWDER, LYOPHILIZED, FOR SOLUTION INTRAVENOUS at 03:12

## 2020-07-25 RX ADMIN — CHLORPROMAZINE HYDROCHLORIDE 25 MG: 25 TABLET, SUGAR COATED ORAL at 17:54

## 2020-07-25 RX ADMIN — PROMETHAZINE HYDROCHLORIDE 12.5 MG: 25 TABLET ORAL at 08:47

## 2020-07-25 RX ADMIN — LACTULOSE 20 G: 20 SOLUTION ORAL at 13:45

## 2020-07-25 RX ADMIN — LACTULOSE 20 G: 20 SOLUTION ORAL at 17:55

## 2020-07-25 RX ADMIN — PANTOPRAZOLE SODIUM 20 MG: 20 TABLET, DELAYED RELEASE ORAL at 07:06

## 2020-07-25 RX ADMIN — ENOXAPARIN SODIUM 40 MG: 40 INJECTION SUBCUTANEOUS at 08:46

## 2020-07-25 ASSESSMENT — PAIN SCALES - PAIN ASSESSMENT IN ADVANCED DEMENTIA (PAINAD)
BODYLANGUAGE: 0
BREATHING: 0
NEGVOCALIZATION: 0
FACIALEXPRESSION: 0
TOTALSCORE: 0
CONSOLABILITY: 0

## 2020-07-25 ASSESSMENT — PAIN SCALES - GENERAL
PAINLEVEL_OUTOF10: 0

## 2020-07-25 NOTE — PROGRESS NOTES
Called patient's son [facility] [address] [place] [address] [phone]. Contact info obtained from [facility] on patient status. MRI screening completed.

## 2020-07-25 NOTE — PROGRESS NOTES
Hospitalist Progress Note      PCP: No primary care provider on file. Date of Admission: 7/24/2020    Chief Complaint: altered mental status    Hospital Course: The patient Gus Rajan is a 79 y.o.male With medical history significant for alcohol abuse and cirrhosis of the liver and anemia and dementia and hypertension  Patient brought to the emergency room for generalized weakness and further deterioration in mental status in a patient with dementia. Subjective: Pt seen and examined. More awake, but confused. Trying to get out of bed. AxO to self only. Only complaint is constant headache. Denies fever, chills, chest pain, shortness of breath, abdominal pain, nausea, vomiting, constipation, diarrhea, and dysuria. Medications:  Reviewed    Infusion Medications    dextrose       Scheduled Medications    azithromycin  500 mg Intravenous Q24H    cefTRIAXone (ROCEPHIN) IV  1 g Intravenous Q24H    chlorproMAZINE  25 mg Oral 4x Daily    lactulose  30 mL Oral 4x Daily    pantoprazole  20 mg Oral QAM AC    sodium chloride flush  10 mL Intravenous 2 times per day    enoxaparin  40 mg Subcutaneous Daily     PRN Meds: acetaminophen, bisacodyl, fleet, sodium chloride flush, potassium chloride **OR** potassium alternative oral replacement **OR** potassium chloride, acetaminophen **OR** acetaminophen, polyethylene glycol, promethazine **OR** ondansetron, zolpidem      Intake/Output Summary (Last 24 hours) at 7/25/2020 1108  Last data filed at 7/25/2020 8928  Gross per 24 hour   Intake 2220 ml   Output 550 ml   Net 1670 ml       Exam:    /82   Pulse 84   Temp 97.7 °F (36.5 °C) (Axillary)   Resp 18   Wt 191 lb 2.2 oz (86.7 kg)   SpO2 94%   BMI 25.92 kg/m²     General appearance: In wrist restraints. No apparent distress, appears stated age and cooperative. HEENT: Pupils equal, round, and reactive to light. Conjunctivae/corneas clear. Neck: Supple, with full range of motion.  No jugular underlying dementia, hypernatremia, or CNS process.  Ammonia level is 44, doubt hepatic enephalopathy  -continue to treat pneumonia as below, COVID-19 test pending  -switch IVF from NS to D5W as hypernatremia is worsening and consult nephrology  -CT Head without contrast showed no acute intracranial abnormality; obtain MRI Brain without contrast given complaint of persistent headache  -continue lactulose    Suspected COVID-19 infection  -continue empiric Azithromycin/ceftriaxone  -droplet plus precautions  -COVID-19 test pending  -check inflammatory markers    Hypernatremia  -change NS to D5W  -nephrology consulted, recs appreciated    Debility  -PT/OT eval    Severe protein calorie malnutrition  -supplements provided  -Dietitian consult    Alcoholic cirrhosis - appears compensated  -continue to monitor  -continue lactulose      DVT Prophylaxis: Lovenox  Diet: DIET GENERAL;  Code Status: Full Code    PT/OT Eval Status: ordered    Dispo - continue care    Tonya Becker MD

## 2020-07-25 NOTE — PROGRESS NOTES
Patient confused and restless today. Repeatedly attempting to remove restraints, get out of bed, pull off telemetry leads, pull at iv tubing. Patient successfully wiggled out of soft wrist restraints and was standing on side of bed. Order obtained for posey vest. Patient does not know he is hospital despite being reminded he is in the hospital dozens of time this shift. Oriented to self only. Cont tele sitter and restraints for patient safety.

## 2020-07-25 NOTE — PLAN OF CARE
Patient still very confused, trying to pull at lines and iv. Patient constantly moving and shifting weight in bed but will attempt to offload with pillows , elevate heels to prevent skin breakdown.

## 2020-07-25 NOTE — H&P
Hospitalist  History and Physical    Patient:  Justin Davis  MRN: [id number]  PCP: No primary care provider on file. CHIEF COMPLAINT:  failure to thrive. HISTORY OF PRESENT ILLNESS:   The patient Justin Davis is a 79 y.o.male With medical history significant for alcohol abuse and cirrhosis of the liver and anemia and dementia and hypertension  Patient brought to the emergency room for generalized weakness and further deterioration in mental status in a patient with dementia. Patient is unable to provide any history    Past Medical History:        Diagnosis Date    Alcohol dependence with alcohol-induced persisting dementia ([facility] [address]     Alcoholic cirrhosis of liver with ascites ([facility] [address] [facility]     Alcoholic hepatitis without ascites     Anemia     Anxiety     Chronic hepatitis in viral disease ([facility] [address] [facility]     Cognitive communication deficit     Coordination abnormal     Dementia ([facility] [address]     GERD (gastroesophageal reflux disease)     Hypertension     Malnutrition ([facility] [address]        Past Surgical History:    History reviewed. No pertinent surgical history. Medications Prior to Admission:    Prior to Admission medications    Medication Sig Start Date End Date Taking?  Authorizing Provider   chlorproMAZINE (THORAZINE) 25 MG tablet Take 1 tablet by mouth 4 times daily 6/24/20  Yes Historical Provider, MD   pantoprazole (PROTONIX) 20 MG tablet Take 1 tablet by mouth daily 7/6/20  Yes Historical Provider, MD   lactulose (CHRONULAC) 10 GM/15ML solution Take 30 mLs by mouth 4 times daily 7/5/20  Yes Historical Provider, MD   bisacodyl (DULCOLAX) 10 MG suppository Place 10 mg rectally daily as needed for Constipation   Yes Historical Provider, MD   magnesium citrate solution Take 296 mLs by mouth daily as needed for Constipation   Yes Historical Provider, MD   magnesium hydroxide (MILK OF MAGNESIA CONCENTRATE) 2400 MG/10ML SUSP Take 30 mLs by mouth daily as needed   Yes Historical Provider, MD   Sodium Phosphates (FLEET) 7-19 GM/118ML Place 1 enema rectally daily as needed   Yes Historical Provider, MD   acetaminophen (TYLENOL) 325 MG tablet Take 650 mg by mouth every 6 hours as needed for Pain   Yes Historical Provider, MD       Allergies:  Patient has no known allergies. Social History:   TOBACCO:   has no history on file for tobacco.  ETOH:   reports previous alcohol use. Family History:   Patient is unable to provide any history        REVIEW OF SYSTEMS:     Unable to do review of systems due to patient's mental status      CONSTITUTIONAL:      fatigue, fever, chills or night sweats, recent weight gain, recent wt loss, insomnia,  General weakness, poor appetite, muscle aches and pains    HEAD: headache, dizziness    EYES:      blurriness,  double vision, dryness,  discharge, irritation,diplopia    EARS:      hearing loss, vertigo, ear discharge,  Earache. Ringing in the ears. NOSE:      Rhinorrhea, sneezing, epistaxis. Discharge, sinusitis,     MOUTH/THROAT:         sore throat, mouth ulcers, Hoarseness    RESPIRATORY:        Shortness of breath, wheezing,  cough, sputum, hemoptysis, obstructive sleep apnea,    CARDIOVASCULAR :      chest pain, palpitations, dyspnea on exercise, Lower extrimity edema (swelling),     GASTROINTESTINAL:       Dysphagia, Poor appetite,  Nausea, Vomiting, diarrhea, heartburn, abdominal pain. Blood in the stools, hematemesis. Pain with swallowing, constipation    GENITOURINARY:       Urinary frequency, hesitancy,  urgency, Dysuria, hematuria,  Urinary Incontinence. Urinary Retention. GYNECOLOGICAL: vaginal bleeding , vaginal discharge, menopause    MUSCULOSKELETAL:       joint swelling or stiffness, joint pain, muscle pain, balance problems, low back pain. NEUROLOGICAL:      Gait problems. Tremor. Dizziness. Pain and paresthesias, weakness in extremities.  Seizures, memory loss    PSYCHLOGICAL:        Anxiety, depression    SKIN :      Rashes ulcers, skin color changes, easy bruisability, lymphadenopathy      Physical Exam:      Vitals: BP (!) 154/87   Pulse 69   Temp 98.1 °F (36.7 °C) (Oral)   Resp 16   SpO2 92%     Gen:          Alert and oriented x 2  Eyes: PERRL. No sclera icterus. No conjunctival injection. ENT: No discharge. Pharynx clear. External appearance of ears and nose normal.  Neck: Trachea midline. No obvious mass. Resp: No accessory muscle use. No crackles. No wheezes. No rhonchi. CV: Regular rate. Regular rhythm. No murmur or rub. No edema. GI: Non-tender. Non-distended. No hernia. Skin: Warm, dry, normal texture and turgor. Lymph: No cervical LAD. No supraclavicular LAD. M/S: / Ext. No cyanosis. No clubbing. No joint deformity. Neuro: Moves all four extremities. CN 2-12 tested, no deficits noted. Peripheral pulses and capillary refill is intact. CBC:   Recent Labs     07/24/20  1342   WBC 8.6   HGB 13.7   *     BMP:    Recent Labs     07/24/20  1342   *   K 4.8   *   CO2 19*   BUN 47*   CREATININE 1.3   GLUCOSE 112*     Hepatic:   Recent Labs     07/24/20  1342   AST 55*   ALT 24   BILITOT 0.7   ALKPHOS 105     Troponin:   Recent Labs     07/24/20  1342   TROPONINI <0.01     BNP: No results for input(s): BNP in the last 72 hours. INR: No results for input(s): INR in the last 72 hours. No results found for: LABA1C        No results for input(s): CKTOTAL in the last 72 hours. -----------------------------------------------------------------    XR CHEST PORTABLE   New bilateral ground-glass and interstitial opacities.  The appearance is    most consistent with atypical/viral pneumonia      CT Head WO Contrast   No acute intracranial abnormality.              Assessment / Plan     Pneumonia  Start patient on antibiotics with ceftriaxone and Zithromax  Bronchodilators as needed    Mental status change  Likely due to pneumonia      Hypernatremia  Poor oral intake    General weakness  OT PT      DVT and GI prophylaxis      Full Bushra Weiner M.D    This note was transcribed using [id number] [address] [employer]. Please disregard any translational errors.

## 2020-07-25 NOTE — PROGRESS NOTES
MRI of brain ordered. Patient is confused and cannot answer screening form. Attempted to call emergency contact but no working number listed. Will call [facility] to attempt to get history for MRI screen.

## 2020-07-26 LAB
A/G RATIO: 0.6 (ref 1.1–2.2)
ALBUMIN SERPL-MCNC: 3 G/DL (ref 3.4–5)
ALP BLD-CCNC: 80 U/L (ref 40–129)
ALT SERPL-CCNC: 22 U/L (ref 10–40)
AMMONIA: 37 UMOL/L (ref 16–60)
ANION GAP SERPL CALCULATED.3IONS-SCNC: 16 MMOL/L (ref 3–16)
AST SERPL-CCNC: 39 U/L (ref 15–37)
BASOPHILS ABSOLUTE: 0 K/UL (ref 0–0.2)
BASOPHILS RELATIVE PERCENT: 0.9 %
BILIRUB SERPL-MCNC: 0.5 MG/DL (ref 0–1)
BUN BLDV-MCNC: 20 MG/DL (ref 7–20)
CALCIUM SERPL-MCNC: 8.4 MG/DL (ref 8.3–10.6)
CHLORIDE BLD-SCNC: 114 MMOL/L (ref 99–110)
CO2: 18 MMOL/L (ref 21–32)
CREAT SERPL-MCNC: 0.9 MG/DL (ref 0.8–1.3)
EOSINOPHILS ABSOLUTE: 0.1 K/UL (ref 0–0.6)
EOSINOPHILS RELATIVE PERCENT: 1.6 %
GFR AFRICAN AMERICAN: >60
GFR NON-AFRICAN AMERICAN: >60
GLOBULIN: 5.2 G/DL
GLUCOSE BLD-MCNC: 116 MG/DL (ref 70–99)
GLUCOSE BLD-MCNC: 62 MG/DL (ref 70–99)
GLUCOSE BLD-MCNC: 78 MG/DL (ref 70–99)
GLUCOSE BLD-MCNC: 97 MG/DL (ref 70–99)
HCT VFR BLD CALC: 35.5 % (ref 40.5–52.5)
HEMOGLOBIN: 11.3 G/DL (ref 13.5–17.5)
LYMPHOCYTES ABSOLUTE: 1.2 K/UL (ref 1–5.1)
LYMPHOCYTES RELATIVE PERCENT: 25.7 %
MCH RBC QN AUTO: 27.3 PG (ref 26–34)
MCHC RBC AUTO-ENTMCNC: 31.8 G/DL (ref 31–36)
MCV RBC AUTO: 85.8 FL (ref 80–100)
MONOCYTES ABSOLUTE: 0.5 K/UL (ref 0–1.3)
MONOCYTES RELATIVE PERCENT: 10.1 %
NEUTROPHILS ABSOLUTE: 2.9 K/UL (ref 1.7–7.7)
NEUTROPHILS RELATIVE PERCENT: 61.7 %
PDW BLD-RTO: 16.1 % (ref 12.4–15.4)
PERFORMED ON: ABNORMAL
PERFORMED ON: NORMAL
PERFORMED ON: NORMAL
PLATELET # BLD: 328 K/UL (ref 135–450)
PMV BLD AUTO: 7.9 FL (ref 5–10.5)
POTASSIUM REFLEX MAGNESIUM: 4.1 MMOL/L (ref 3.5–5.1)
PROCALCITONIN: 0.09 NG/ML (ref 0–0.15)
RBC # BLD: 4.14 M/UL (ref 4.2–5.9)
SODIUM BLD-SCNC: 148 MMOL/L (ref 136–145)
TOTAL PROTEIN: 8.2 G/DL (ref 6.4–8.2)
WBC # BLD: 4.8 K/UL (ref 4–11)

## 2020-07-26 PROCEDURE — 84145 PROCALCITONIN (PCT): CPT

## 2020-07-26 PROCEDURE — U0003 INFECTIOUS AGENT DETECTION BY NUCLEIC ACID (DNA OR RNA); SEVERE ACUTE RESPIRATORY SYNDROME CORONAVIRUS 2 (SARS-COV-2) (CORONAVIRUS DISEASE [COVID-19]), AMPLIFIED PROBE TECHNIQUE, MAKING USE OF HIGH THROUGHPUT TECHNOLOGIES AS DESCRIBED BY CMS-2020-01-R: HCPCS

## 2020-07-26 PROCEDURE — 6370000000 HC RX 637 (ALT 250 FOR IP): Performed by: INTERNAL MEDICINE

## 2020-07-26 PROCEDURE — 6360000002 HC RX W HCPCS: Performed by: HOSPITALIST

## 2020-07-26 PROCEDURE — 6360000002 HC RX W HCPCS: Performed by: INTERNAL MEDICINE

## 2020-07-26 PROCEDURE — 85025 COMPLETE CBC W/AUTO DIFF WBC: CPT

## 2020-07-26 PROCEDURE — 80053 COMPREHEN METABOLIC PANEL: CPT

## 2020-07-26 PROCEDURE — 82140 ASSAY OF AMMONIA: CPT

## 2020-07-26 PROCEDURE — 1200000000 HC SEMI PRIVATE

## 2020-07-26 PROCEDURE — 6370000000 HC RX 637 (ALT 250 FOR IP): Performed by: HOSPITALIST

## 2020-07-26 PROCEDURE — 2580000003 HC RX 258: Performed by: HOSPITALIST

## 2020-07-26 PROCEDURE — 2580000003 HC RX 258: Performed by: INTERNAL MEDICINE

## 2020-07-26 PROCEDURE — 36415 COLL VENOUS BLD VENIPUNCTURE: CPT

## 2020-07-26 RX ORDER — HALOPERIDOL 5 MG/ML
2 INJECTION INTRAMUSCULAR EVERY 6 HOURS PRN
Status: DISCONTINUED | OUTPATIENT
Start: 2020-07-26 | End: 2020-07-30 | Stop reason: HOSPADM

## 2020-07-26 RX ORDER — QUETIAPINE FUMARATE 25 MG/1
25 TABLET, FILM COATED ORAL 2 TIMES DAILY
Status: DISCONTINUED | OUTPATIENT
Start: 2020-07-26 | End: 2020-07-30 | Stop reason: HOSPADM

## 2020-07-26 RX ADMIN — AZITHROMYCIN MONOHYDRATE 500 MG: 500 INJECTION, POWDER, LYOPHILIZED, FOR SOLUTION INTRAVENOUS at 03:36

## 2020-07-26 RX ADMIN — CHLORPROMAZINE HYDROCHLORIDE 25 MG: 25 TABLET, SUGAR COATED ORAL at 13:51

## 2020-07-26 RX ADMIN — ENOXAPARIN SODIUM 40 MG: 40 INJECTION SUBCUTANEOUS at 10:38

## 2020-07-26 RX ADMIN — CHLORPROMAZINE HYDROCHLORIDE 25 MG: 25 TABLET, SUGAR COATED ORAL at 10:38

## 2020-07-26 RX ADMIN — QUETIAPINE FUMARATE 25 MG: 25 TABLET ORAL at 20:31

## 2020-07-26 RX ADMIN — LACTULOSE 20 G: 20 SOLUTION ORAL at 13:51

## 2020-07-26 RX ADMIN — LACTULOSE 20 G: 20 SOLUTION ORAL at 16:06

## 2020-07-26 RX ADMIN — CHLORPROMAZINE HYDROCHLORIDE 25 MG: 25 TABLET, SUGAR COATED ORAL at 16:08

## 2020-07-26 RX ADMIN — SODIUM CHLORIDE, PRESERVATIVE FREE 10 ML: 5 INJECTION INTRAVENOUS at 20:32

## 2020-07-26 RX ADMIN — RIFAXIMIN 550 MG: 550 TABLET ORAL at 20:31

## 2020-07-26 RX ADMIN — QUETIAPINE FUMARATE 25 MG: 25 TABLET ORAL at 16:06

## 2020-07-26 RX ADMIN — PANTOPRAZOLE SODIUM 20 MG: 20 TABLET, DELAYED RELEASE ORAL at 10:38

## 2020-07-26 RX ADMIN — ZOLPIDEM TARTRATE 5 MG: 5 TABLET ORAL at 20:31

## 2020-07-26 RX ADMIN — CHLORPROMAZINE HYDROCHLORIDE 25 MG: 25 TABLET, SUGAR COATED ORAL at 20:31

## 2020-07-26 RX ADMIN — LACTULOSE 20 G: 20 SOLUTION ORAL at 10:38

## 2020-07-26 RX ADMIN — HALOPERIDOL LACTATE: 5 INJECTION, SOLUTION INTRAMUSCULAR at 13:50

## 2020-07-26 RX ADMIN — CEFTRIAXONE 1 G: 1 INJECTION, POWDER, FOR SOLUTION INTRAMUSCULAR; INTRAVENOUS at 02:29

## 2020-07-26 RX ADMIN — DEXTROSE MONOHYDRATE: 50 INJECTION, SOLUTION INTRAVENOUS at 10:39

## 2020-07-26 RX ADMIN — RIFAXIMIN 550 MG: 550 TABLET ORAL at 16:09

## 2020-07-26 ASSESSMENT — PAIN SCALES - PAIN ASSESSMENT IN ADVANCED DEMENTIA (PAINAD)
CONSOLABILITY: 0
FACIALEXPRESSION: 0
NEGVOCALIZATION: 0
FACIALEXPRESSION: 0
BODYLANGUAGE: 0
CONSOLABILITY: 0
FACIALEXPRESSION: 0
TOTALSCORE: 0
FACIALEXPRESSION: 0
TOTALSCORE: 0
BREATHING: 0
BODYLANGUAGE: 0
CONSOLABILITY: 0
NEGVOCALIZATION: 0
BODYLANGUAGE: 0
NEGVOCALIZATION: 0
BODYLANGUAGE: 0
NEGVOCALIZATION: 0
FACIALEXPRESSION: 0
BODYLANGUAGE: 0
FACIALEXPRESSION: 0
TOTALSCORE: 0
BREATHING: 0
FACIALEXPRESSION: 0
CONSOLABILITY: 0
BODYLANGUAGE: 0
BREATHING: 0
BODYLANGUAGE: 0
CONSOLABILITY: 0
BREATHING: 0
TOTALSCORE: 0
TOTALSCORE: 0
BREATHING: 0
CONSOLABILITY: 0
BODYLANGUAGE: 0
BREATHING: 0
FACIALEXPRESSION: 0
TOTALSCORE: 0
BODYLANGUAGE: 0
FACIALEXPRESSION: 0
TOTALSCORE: 0
BODYLANGUAGE: 0
CONSOLABILITY: 0
BREATHING: 0
TOTALSCORE: 0
BODYLANGUAGE: 0
TOTALSCORE: 0
TOTALSCORE: 0
NEGVOCALIZATION: 0
FACIALEXPRESSION: 0
NEGVOCALIZATION: 0
BREATHING: 0
NEGVOCALIZATION: 0
BODYLANGUAGE: 0
CONSOLABILITY: 0
BREATHING: 0
CONSOLABILITY: 0
FACIALEXPRESSION: 0
TOTALSCORE: 0
CONSOLABILITY: 0
TOTALSCORE: 0
FACIALEXPRESSION: 0
NEGVOCALIZATION: 0
CONSOLABILITY: 0
NEGVOCALIZATION: 0
CONSOLABILITY: 0
BREATHING: 0
NEGVOCALIZATION: 0
BREATHING: 0
BREATHING: 0

## 2020-07-26 ASSESSMENT — PAIN SCALES - GENERAL
PAINLEVEL_OUTOF10: 0
PAINLEVEL_OUTOF10: 0

## 2020-07-26 NOTE — CONSULTS
[address] [address]                                  CONSULTATION    PATIENT NAME: Jayy Sanchez                   :        1950  MED REC NO:   [id number]                          ROOM:       [id number]  ACCOUNT NO:   [de-identified]                           ADMIT DATE: 2020  PROVIDER:     Julius Hull MD    RENAL CONSULTATION    CONSULT DATE:  2020    ADMITTING PROVIDER:  Lela Chirinos MD    REASON FOR ADMISSION:    Failure to thrive. REASON FOR CONSULTATION:   Hypernatremia. HISTORY OF PRESENTING COMPLAINT:  This 77-year-old gentleman with  history of alcohol abuse, cirrhosis, dementia, presents with generalized  weakness. The patient was unable to provide any history. He was found  to have a sodium of 152 which has now gone up to 155. He is also being checked for COVID. REVIEW OF THE SYSTEMS:  Not much history available. 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 PERSONAL AND SOCIAL HISTORY:  No history of smoking in the file. History of alcohol use as mentioned. HOME MEDICATIONS:  Include Thorazine, Protonix, lactulose, Dulcolax,  magnesium. ALLERGIES:  No known drug allergies. PHYSICAL EXAMINATION:  His vitals, temperature 98.4, respiratory rate  20, pulse 77, blood pressure 122/88. LABORATORY DATA:  Sodium 156, potassium 4.6, chloride 123, BUN is 34,  creatinine is 1.1 down from 1.3.  C-reactive protein is 33, albumin is  3.5, globulin 6.5.  _____ albumin to globulin ratio. His ALT is 24. Ammonia is 44. AST is 55. Ethanol was not detected. White cell 8.6,  hemoglobin is 13.7, platelet count 144. Differential count is normal.   Ferritin is 294, saturation is 83. Urinalysis:  Specific gravity 1.023,  protein 30, pH 7.4, pCO2 33, PO2 is 69. ASSESSMENT AND PLAN:     Hypernatremia   probably due to poor p.o. intake.    Changed saline to D5W and monitor. Would expect improvement. Possible COVID  Result pending     chest x-ray shows atypical viral pneumonia. Alcoholic cirrhosis   seems to be compensated.     Management as per team.    H/O Dwight ZAMUDIO MD    D: 07/26/2020 0:22:07       T: 07/26/2020 5:24:43     [email or web address]  Job#: [id number]     Doc#: [id number]    CC:

## 2020-07-26 NOTE — PROGRESS NOTES
Hospitalist Progress Note      PCP: No primary care provider on file. Date of Admission: 7/24/2020    Chief Complaint: altered mental status    Hospital Course: The patient Agapito Landau is a 79 y.o.male With medical history significant for alcohol abuse and cirrhosis of the liver and anemia and dementia and hypertension  Patient brought to the emergency room for generalized weakness and further deterioration in mental status in a patient with dementia. Subjective: Pt seen and examined. Still confused. Has no complaints. Medications:  Reviewed    Infusion Medications    dextrose 100 mL/hr at 07/26/20 1039     Scheduled Medications    QUEtiapine  25 mg Oral BID    azithromycin  500 mg Intravenous Q24H    cefTRIAXone (ROCEPHIN) IV  1 g Intravenous Q24H    chlorproMAZINE  25 mg Oral 4x Daily    lactulose  30 mL Oral 4x Daily    pantoprazole  20 mg Oral QAM AC    sodium chloride flush  10 mL Intravenous 2 times per day    enoxaparin  40 mg Subcutaneous Daily     PRN Meds: haloperidol lactate, acetaminophen, bisacodyl, fleet, sodium chloride flush, potassium chloride **OR** potassium alternative oral replacement **OR** potassium chloride, acetaminophen **OR** acetaminophen, polyethylene glycol, promethazine **OR** ondansetron, zolpidem      Intake/Output Summary (Last 24 hours) at 7/26/2020 1407  Last data filed at 7/25/2020 1753  Gross per 24 hour   Intake 480 ml   Output --   Net 480 ml       Exam:    BP (!) 145/82   Pulse 70   Temp 97.1 °F (36.2 °C) (Axillary)   Resp 16   Wt 191 lb 2.2 oz (86.7 kg)   SpO2 94%   BMI 25.92 kg/m²     General appearance: In wrist restraints and Gricelda Vest. No apparent distress, appears stated age and cooperative. HEENT: Pupils equal, round, and reactive to light. Conjunctivae/corneas clear. Neck: Supple, with full range of motion. No jugular venous distention. Trachea midline. Respiratory:  Normal respiratory effort.  Clear to auscultation, bilaterally without Rales/Wheezes/Rhonchi. Cardiovascular: Regular rate and rhythm with normal S1/S2 without murmurs, rubs or gallops. Abdomen: Soft, non-tender, non-distended with normal bowel sounds. Musculoskeletal: No clubbing, cyanosis or edema bilaterally. Full range of motion without deformity. Skin: Skin color, texture, turgor normal.  No rashes or lesions. Neurologic:  Neurovascularly intact without any focal sensory/motor deficits. Cranial nerves: II-XII intact, grossly non-focal.  Psychiatric: Alert and oriented to self only  Capillary Refill: Brisk,< 3 seconds   Peripheral Pulses: +2 palpable, equal bilaterally       Labs:   Recent Labs     07/24/20  1342 07/25/20  0800 07/26/20  0635   WBC 8.6 5.3 4.8   HGB 13.7 13.3* 11.3*   HCT 43.2 42.8 35.5*   * 411 328     Recent Labs     07/24/20  1342 07/25/20  0800 07/26/20  0635   * 156* 148*   K 4.8 4.6 4.1   * 123* 114*   CO2 19* 20* 18*   BUN 47* 34* 20   CREATININE 1.3 1.1 0.9   CALCIUM 9.2 8.8 8.4   PHOS 3.6  --   --      Recent Labs     07/24/20  1342 07/26/20  0635   AST 55* 39*   ALT 24 22   BILITOT 0.7 0.5   ALKPHOS 105 80     No results for input(s): INR in the last 72 hours. Recent Labs     07/24/20  1342   TROPONINI <0.01       Urinalysis:      Lab Results   Component Value Date    NITRU Negative 07/24/2020    WBCUA 1 07/24/2020    BACTERIA 4+ 07/08/2020    RBCUA 2 07/24/2020    BLOODU Negative 07/24/2020    SPECGRAV 1.023 07/24/2020    GLUCOSEU Negative 07/24/2020       Radiology:  XR CHEST PORTABLE   Final Result   New bilateral ground-glass and interstitial opacities. The appearance is   most consistent with atypical/viral pneumonia         CT Head WO Contrast   Final Result   No acute intracranial abnormality.          MRI BRAIN WO CONTRAST    (Results Pending)           Assessment/Plan:    Active Hospital Problems    Diagnosis Date Noted    Change in mental status [R41.82] 07/24/2020       Acute metabolic

## 2020-07-26 NOTE — PROGRESS NOTES
Admit Date: 7/24/2020      REASON FOR ADMISSION:    Failure to thrive.     REASON FOR CONSULTATION:   Hypernatremia. INTERVAL HISTORY  Pt restless pulled multiple IVs, pulled tele monitor off repeatedly, and removed restraints multiple times  Na 156-->148  Urine no record   He is clinically dry     PLAN  Continue with D5W     Hypernatremia  due to poor p.o. intake. D5W and monitor. Would expect improvement. Possible COVID  Result pending     chest x-ray shows atypical viral pneumonia. Alcoholic cirrhosis   seems to be compensated. Management as per team.    H/O Dementia       [facility]  26-year-old gentleman with  history of alcohol abuse, cirrhosis, dementia, presents with generalized  weakness. The patient was unable to provide any history. He was found  to have a sodium of 152 which has now gone up to 155.     He is also being checked for COVID. Subjective:   Patient has no complaint       Review of Systems  Seen in room     Objective:     Patient Vitals for the past 8 hrs:   BP Temp Temp src Pulse Resp SpO2   07/26/20 1716 (!) 152/90 98.6 °F (37 °C) Oral 100 18 98 %       I/O last 3 completed shifts:   In: 480 [P.O.:480]  Out: -         General appearance:resting    Neck: , no JVD and thyroid not enlarged,   Lungs: clear to auscultation bilaterally   Heart: regular rate and rhythm, S1, S2 normal, no murmur, click, rub or gallop  Abdomen: soft, non-tender; bowel sounds normal; no masses,  no organomegaly  Extremities: extremities normal, atraumatic, no edema  Skin: Skin color, texture, turgor normal. No rashes or lesions  Neurologic: resting was agitated earlier     .l  Lab Results   Component Value Date    CREATININE 0.9 07/26/2020    BUN 20 07/26/2020     (H) 07/26/2020    K 4.1 07/26/2020     (H) 07/26/2020    CO2 18 (L) 07/26/2020     Lab Results   Component Value Date    WBC 4.8 07/26/2020    HGB 11.3 (L) 07/26/2020    HCT 35.5 (L) 07/26/2020    MCV 85.8 07/26/2020    PLT 328 07/26/2020            AGLUCOSE)Magnesium:    Lab Results   Component Value Date    MG 3.60 07/24/2020     Phosphorus:    Lab Results   Component Value Date    PHOS 3.6 07/24/2020       Uric Acid:  No components found for: URIC    Active Problems:    Change in mental status  Resolved Problems:    * No resolved hospital problems.  *

## 2020-07-27 LAB
A/G RATIO: 0.6 (ref 1.1–2.2)
ALBUMIN SERPL-MCNC: 2.9 G/DL (ref 3.4–5)
ALP BLD-CCNC: 87 U/L (ref 40–129)
ALT SERPL-CCNC: 22 U/L (ref 10–40)
ANION GAP SERPL CALCULATED.3IONS-SCNC: 14 MMOL/L (ref 3–16)
AST SERPL-CCNC: 36 U/L (ref 15–37)
BASOPHILS ABSOLUTE: 0 K/UL (ref 0–0.2)
BASOPHILS RELATIVE PERCENT: 1 %
BILIRUB SERPL-MCNC: 0.5 MG/DL (ref 0–1)
BUN BLDV-MCNC: 11 MG/DL (ref 7–20)
CALCIUM SERPL-MCNC: 8.9 MG/DL (ref 8.3–10.6)
CHLORIDE BLD-SCNC: 110 MMOL/L (ref 99–110)
CO2: 20 MMOL/L (ref 21–32)
CREAT SERPL-MCNC: 0.9 MG/DL (ref 0.8–1.3)
EOSINOPHILS ABSOLUTE: 0.1 K/UL (ref 0–0.6)
EOSINOPHILS RELATIVE PERCENT: 2 %
GFR AFRICAN AMERICAN: >60
GFR NON-AFRICAN AMERICAN: >60
GLOBULIN: 5.2 G/DL
GLUCOSE BLD-MCNC: 109 MG/DL (ref 70–99)
GLUCOSE BLD-MCNC: 78 MG/DL (ref 70–99)
GLUCOSE BLD-MCNC: 87 MG/DL (ref 70–99)
GLUCOSE BLD-MCNC: 87 MG/DL (ref 70–99)
HCT VFR BLD CALC: 37.2 % (ref 40.5–52.5)
HEMOGLOBIN: 11.9 G/DL (ref 13.5–17.5)
LYMPHOCYTES ABSOLUTE: 0.8 K/UL (ref 1–5.1)
LYMPHOCYTES RELATIVE PERCENT: 21 %
MCH RBC QN AUTO: 28 PG (ref 26–34)
MCHC RBC AUTO-ENTMCNC: 32 G/DL (ref 31–36)
MCV RBC AUTO: 87.3 FL (ref 80–100)
MONOCYTES ABSOLUTE: 0.4 K/UL (ref 0–1.3)
MONOCYTES RELATIVE PERCENT: 10 %
NEUTROPHILS ABSOLUTE: 2.4 K/UL (ref 1.7–7.7)
NEUTROPHILS RELATIVE PERCENT: 66 %
PDW BLD-RTO: 16.2 % (ref 12.4–15.4)
PERFORMED ON: ABNORMAL
PERFORMED ON: NORMAL
PERFORMED ON: NORMAL
PLATELET # BLD: 366 K/UL (ref 135–450)
PMV BLD AUTO: 8.6 FL (ref 5–10.5)
POTASSIUM REFLEX MAGNESIUM: 4.1 MMOL/L (ref 3.5–5.1)
RBC # BLD: 4.25 M/UL (ref 4.2–5.9)
SARS-COV-2, PCR: DETECTED
SODIUM BLD-SCNC: 144 MMOL/L (ref 136–145)
TOTAL PROTEIN: 8.1 G/DL (ref 6.4–8.2)
WBC # BLD: 3.7 K/UL (ref 4–11)

## 2020-07-27 PROCEDURE — 1200000000 HC SEMI PRIVATE

## 2020-07-27 PROCEDURE — 6370000000 HC RX 637 (ALT 250 FOR IP): Performed by: HOSPITALIST

## 2020-07-27 PROCEDURE — 80053 COMPREHEN METABOLIC PANEL: CPT

## 2020-07-27 PROCEDURE — 2580000003 HC RX 258: Performed by: HOSPITALIST

## 2020-07-27 PROCEDURE — 36415 COLL VENOUS BLD VENIPUNCTURE: CPT

## 2020-07-27 PROCEDURE — 6370000000 HC RX 637 (ALT 250 FOR IP): Performed by: INTERNAL MEDICINE

## 2020-07-27 PROCEDURE — 6360000002 HC RX W HCPCS: Performed by: HOSPITALIST

## 2020-07-27 PROCEDURE — 85025 COMPLETE CBC W/AUTO DIFF WBC: CPT

## 2020-07-27 PROCEDURE — 6360000002 HC RX W HCPCS: Performed by: INTERNAL MEDICINE

## 2020-07-27 PROCEDURE — 2580000003 HC RX 258: Performed by: INTERNAL MEDICINE

## 2020-07-27 RX ADMIN — HALOPERIDOL LACTATE 2 MG: 5 INJECTION, SOLUTION INTRAMUSCULAR at 04:31

## 2020-07-27 RX ADMIN — CHLORPROMAZINE HYDROCHLORIDE 25 MG: 25 TABLET, SUGAR COATED ORAL at 16:28

## 2020-07-27 RX ADMIN — LACTULOSE 20 G: 20 SOLUTION ORAL at 12:20

## 2020-07-27 RX ADMIN — AZITHROMYCIN MONOHYDRATE 500 MG: 500 INJECTION, POWDER, LYOPHILIZED, FOR SOLUTION INTRAVENOUS at 02:49

## 2020-07-27 RX ADMIN — QUETIAPINE FUMARATE 25 MG: 25 TABLET ORAL at 20:02

## 2020-07-27 RX ADMIN — RIFAXIMIN 550 MG: 550 TABLET ORAL at 20:01

## 2020-07-27 RX ADMIN — CEFTRIAXONE 1 G: 1 INJECTION, POWDER, FOR SOLUTION INTRAMUSCULAR; INTRAVENOUS at 01:55

## 2020-07-27 RX ADMIN — RIFAXIMIN 550 MG: 550 TABLET ORAL at 12:03

## 2020-07-27 RX ADMIN — PROMETHAZINE HYDROCHLORIDE 12.5 MG: 25 TABLET ORAL at 12:03

## 2020-07-27 RX ADMIN — ENOXAPARIN SODIUM 40 MG: 40 INJECTION SUBCUTANEOUS at 20:01

## 2020-07-27 RX ADMIN — LACTULOSE 20 G: 20 SOLUTION ORAL at 18:00

## 2020-07-27 RX ADMIN — DEXTROSE MONOHYDRATE: 50 INJECTION, SOLUTION INTRAVENOUS at 06:36

## 2020-07-27 RX ADMIN — PANTOPRAZOLE SODIUM 20 MG: 20 TABLET, DELAYED RELEASE ORAL at 06:15

## 2020-07-27 RX ADMIN — LACTULOSE 20 G: 20 SOLUTION ORAL at 20:02

## 2020-07-27 RX ADMIN — QUETIAPINE FUMARATE 25 MG: 25 TABLET ORAL at 12:03

## 2020-07-27 RX ADMIN — CHLORPROMAZINE HYDROCHLORIDE 25 MG: 25 TABLET, SUGAR COATED ORAL at 12:03

## 2020-07-27 RX ADMIN — CHLORPROMAZINE HYDROCHLORIDE 25 MG: 25 TABLET, SUGAR COATED ORAL at 20:01

## 2020-07-27 ASSESSMENT — PAIN SCALES - PAIN ASSESSMENT IN ADVANCED DEMENTIA (PAINAD)
CONSOLABILITY: 0
FACIALEXPRESSION: 0
BREATHING: 0
CONSOLABILITY: 0
TOTALSCORE: 0
BODYLANGUAGE: 0
TOTALSCORE: 0
NEGVOCALIZATION: 0
BREATHING: 0
CONSOLABILITY: 0
CONSOLABILITY: 0
FACIALEXPRESSION: 0
FACIALEXPRESSION: 0
TOTALSCORE: 0
BODYLANGUAGE: 0
CONSOLABILITY: 0
NEGVOCALIZATION: 0
BODYLANGUAGE: 0
FACIALEXPRESSION: 0
BREATHING: 0
BREATHING: 0
CONSOLABILITY: 0
NEGVOCALIZATION: 0
BREATHING: 0
BREATHING: 0
FACIALEXPRESSION: 0
CONSOLABILITY: 0
FACIALEXPRESSION: 0
NEGVOCALIZATION: 0
BREATHING: 0
CONSOLABILITY: 0
TOTALSCORE: 0
BREATHING: 0
CONSOLABILITY: 0
NEGVOCALIZATION: 0
BODYLANGUAGE: 0
CONSOLABILITY: 0
NEGVOCALIZATION: 0
BODYLANGUAGE: 0
TOTALSCORE: 0
CONSOLABILITY: 0
BREATHING: 0
TOTALSCORE: 0
BODYLANGUAGE: 0
FACIALEXPRESSION: 0
TOTALSCORE: 0
CONSOLABILITY: 0
BREATHING: 0
BODYLANGUAGE: 0
FACIALEXPRESSION: 0
BODYLANGUAGE: 0
BREATHING: 0
NEGVOCALIZATION: 0
TOTALSCORE: 0
FACIALEXPRESSION: 0
NEGVOCALIZATION: 0
BODYLANGUAGE: 0
FACIALEXPRESSION: 0
BODYLANGUAGE: 0
BODYLANGUAGE: 0
TOTALSCORE: 0
BREATHING: 0
TOTALSCORE: 0
BODYLANGUAGE: 0
TOTALSCORE: 0
FACIALEXPRESSION: 0
TOTALSCORE: 0
FACIALEXPRESSION: 0

## 2020-07-27 ASSESSMENT — PAIN SCALES - GENERAL
PAINLEVEL_OUTOF10: 0
PAINLEVEL_OUTOF10: 0

## 2020-07-27 NOTE — ACP (ADVANCE CARE PLANNING)
Advance Care Planning     Advance Care Planning Activator (Inpatient)  Conversation Note      Date of ACP Conversation: 7/24/2020    [facility] Conducted with: Helen Gooden [phone]    ACP Activator: WardBelén SAVANA Baeza Decision Maker:     Current Designated Health Care Decision Maker: Ernesto Lucio [phone]. Reports he is HCPOA     Care Preferences    Ventilation: \"If you were in your present state of health and suddenly became very ill and were unable to breathe on your own, what would your preference be about the use of a ventilator (breathing machine) if it were available to you? \"      Would the patient desire the use of ventilator (breathing machine)?: yes    \"If your health worsens and it becomes clear that your chance of recovery is unlikely, what would your preference be about the use of a ventilator (breathing machine) if it were available to you? \"     Would the patient desire the use of ventilator (breathing machine)?: no      Resuscitation  \"CPR works best to restart the heart when there is a sudden event, like a heart attack, in someone who is otherwise healthy. Unfortunately, CPR does not typically restart the heart for people who have serious health conditions or who are very sick. \"    \"In the event your heart stopped as a result of an underlying serious health condition, would you want attempts to be made to restart your heart (answer \"yes\" for attempt to resuscitate) or would you prefer a natural death (answer \"no\" for do not attempt to resuscitate)? \" yes     [] Yes   [x] No   Educated Patient / Roxie Gruber regarding differences between Advance Directives and portable DNR orders.     Length of ACP Conversation in minutes:      Conversation Outcomes:  [x] ACP discussion completed  [] Existing advance directive reviewed with patient; no changes to patient's previously recorded wishes  [] New Advance Directive completed  [] Portable Do Not Rescitate prepared for Provider review and signature  [] POLST/POST/MOLST/MOST prepared for Provider review and signature      Follow-up plan:    [] Schedule follow-up conversation to continue planning  [] Referred individual to Provider for additional questions/concerns   [] Advised patient/agent/surrogate to review completed ACP document and update if needed with changes in condition, patient preferences or care setting    [] This note routed to one or more involved healthcare providers      Electronically signed by CHANTELL Ray on 7/27/20 at 10:58 AM EDT

## 2020-07-27 NOTE — PROGRESS NOTES
Physician Progress Note      Sunita Denny  CSN #:                  [id number]  :                       1950  ADMIT DATE:       2020 12:25 PM  [address] [place] DATE:  RESPONDING  PROVIDER #:        Theodora Dockery MD          QUERY TEXT:    Pt admitted with Pneumonia  and noted to have  + for Covid-19. Please   document in progress notes and discharge summary if you are evaluating or   treating any of the following: The medical record reflects the following:  Risk Factors: + COVID Alcohol cirrhosis and malnutrition  Clinical Indicators: per Progress Note Suspected COVID-19 infection, per   Nephrology consult Possible COVID  Result pending   chest x-ray shows atypical viral pneumonia  Treatment: CXR, IV Zithromax and Rocephin IV Isolation and monitoring  Options provided:  -- Acute bronchitis due to COVID-19  -- Acute lower respiratory infection due to COVID-19  -- ARDS due to COVID-19  -- Pneumonia due to COVID-19  -- Other - I will add my own diagnosis  -- Disagree - Clinically unable to determine / Unknown  -- Refer to Clinical Documentation Reviewer    PROVIDER RESPONSE TEXT:    This patient has acute bronchitis due to COVID-19.     Query created by: Braulio Felix on 2020 1:07 PM      Electronically signed by:  Theodora Dockery MD 2020 5:13 PM

## 2020-07-27 NOTE — CARE COORDINATION
Chart reviewed and patient from [facility]. Noted to have dementia. Call to Teto Mccormick [phone]) at [facility] who confirms patient is LTC and able to return. Call to [address] [place] [address] ([phone]) who confirms plan to return.      Electronically signed by CHANTELL Jean-Baptiste on 7/27/2020 at 10:56 AM

## 2020-07-27 NOTE — PROGRESS NOTES
Admit Date: 7/24/2020      REASON FOR ADMISSION:    Failure to thrive.     REASON FOR CONSULTATION:   Hypernatremia. INTERVAL HISTORY  Pt restless pulled multiple IVs, now  in soft restraints. Still very confused and disoriented. Na 156-->148--> 144  Urine no record , incontinent  He is clinically dry   Covid positive but not on oxygen. Mild anemia. PLAN  Continue with D5W  COVID positive     Hypernatremia  due to poor p.o. intake. D5W and monitor. Would expect improvement. Possible COVID  Result pending     chest x-ray shows atypical viral pneumonia. Alcoholic cirrhosis   seems to be compensated. Management as per team.    H/O Dementia       [facility]  70-year-old gentleman with  history of alcohol abuse, cirrhosis, dementia, presents with generalized  weakness. The patient was unable to provide any history. He was found  to have a sodium of 152 which has now gone up to 155.     He is also being checked for COVID. Subjective:   Patient has no complaints. Confused and agitated at times. Still in restraints. Review of Systems  Seen in room     Objective:     Patient Vitals for the past 8 hrs:   BP Temp Temp src Pulse Resp SpO2   07/27/20 1530 (!) 142/80 97.7 °F (36.5 °C) Oral 72 18 --   07/27/20 1012 138/82 97.8 °F (36.6 °C) Oral 69 18 98 %       I/O last 3 completed shifts:   In: 540 [P.O.:540]  Out: -         General appearance:resting    Neck: , no JVD and thyroid not enlarged,   Lungs: clear to auscultation bilaterally   Heart: regular rate and rhythm, S1, S2 normal, no murmur, click, rub or gallop  Abdomen: soft, non-tender; bowel sounds normal; no masses,  no organomegaly  Extremities: extremities normal, atraumatic, no edema  Skin: Skin color, texture, turgor normal. No rashes or lesions  Neurologic: resting was agitated earlier     .l  Lab Results   Component Value Date    CREATININE 0.9 07/27/2020    BUN 11 07/27/2020     07/27/2020    K 4.1 07/27/2020     07/27/2020    CO2 20 (L) 07/27/2020     Lab Results   Component Value Date    WBC 3.7 (L) 07/27/2020    HGB 11.9 (L) 07/27/2020    HCT 37.2 (L) 07/27/2020    MCV 87.3 07/27/2020     07/27/2020            AGLUCOSE)Magnesium:    Lab Results   Component Value Date    MG 3.60 07/24/2020     Phosphorus:    Lab Results   Component Value Date    PHOS 3.6 07/24/2020       Uric Acid:  No components found for: URIC    Active Problems:    Change in mental status  Resolved Problems:    * No resolved hospital problems.  *

## 2020-07-27 NOTE — PLAN OF CARE
Problem: Falls - Risk of:  Goal: Will remain free from falls  Description: Will remain free from falls  Outcome: Ongoing  Goal: Absence of physical injury  Description: Absence of physical injury  Outcome: Ongoing     Problem: Skin Integrity:  Goal: Will show no infection signs and symptoms  Description: Will show no infection signs and symptoms  Outcome: Ongoing  Goal: Absence of new skin breakdown  Description: Absence of new skin breakdown  Outcome: Ongoing     Problem: Confusion - Acute:  Goal: Absence of continued neurological deterioration signs and symptoms  Description: Absence of continued neurological deterioration signs and symptoms  Outcome: Ongoing  Goal: Mental status will be restored to baseline  Description: Mental status will be restored to baseline  Outcome: Ongoing     Problem: Discharge Planning:  Goal: Ability to perform activities of daily living will improve  Description: Ability to perform activities of daily living will improve  Outcome: Ongoing  Goal: Participates in care planning  Description: Participates in care planning  Outcome: Ongoing     Problem: Injury - Risk of, Physical Injury:  Goal: Will remain free from falls  Description: Will remain free from falls  Outcome: Ongoing  Goal: Absence of physical injury  Description: Absence of physical injury  Outcome: Ongoing     Problem: Mood - Altered:  Goal: Mood stable  Description: Mood stable  Outcome: Ongoing  Goal: Absence of abusive behavior  Description: Absence of abusive behavior  Outcome: Ongoing  Goal: Verbalizations of feeling emotionally comfortable while being cared for will increase  Description: Verbalizations of feeling emotionally comfortable while being cared for will increase  Outcome: Ongoing     Problem: Psychomotor Activity - Altered:  Goal: Absence of psychomotor disturbance signs and symptoms  Description: Absence of psychomotor disturbance signs and symptoms  Outcome: Ongoing     Problem: Sensory Perception -

## 2020-07-27 NOTE — PROGRESS NOTES
Supple, with full range of motion. No jugular venous distention. Trachea midline. Respiratory:  Normal respiratory effort. Clear to auscultation, bilaterally without Rales/Wheezes/Rhonchi. Cardiovascular: Regular rate and rhythm with normal S1/S2 without murmurs, rubs or gallops. Abdomen: Soft, non-tender, non-distended with normal bowel sounds. Musculoskeletal: No clubbing, cyanosis or edema bilaterally. Full range of motion without deformity. Skin: Skin color, texture, turgor normal.  No rashes or lesions. Neurologic:  Neurovascularly intact without any focal sensory/motor deficits. Cranial nerves: II-XII intact, grossly non-focal.  Psychiatric: Alert and oriented to self only  Capillary Refill: Brisk,< 3 seconds   Peripheral Pulses: +2 palpable, equal bilaterally       Labs:   Recent Labs     07/24/20  1342 07/25/20  0800 07/26/20  0635   WBC 8.6 5.3 4.8   HGB 13.7 13.3* 11.3*   HCT 43.2 42.8 35.5*   * 411 328     Recent Labs     07/24/20  1342 07/25/20  0800 07/26/20  0635   * 156* 148*   K 4.8 4.6 4.1   * 123* 114*   CO2 19* 20* 18*   BUN 47* 34* 20   CREATININE 1.3 1.1 0.9   CALCIUM 9.2 8.8 8.4   PHOS 3.6  --   --      Recent Labs     07/24/20  1342 07/26/20  0635   AST 55* 39*   ALT 24 22   BILITOT 0.7 0.5   ALKPHOS 105 80     No results for input(s): INR in the last 72 hours. Recent Labs     07/24/20  1342   TROPONINI <0.01       Urinalysis:      Lab Results   Component Value Date    NITRU Negative 07/24/2020    WBCUA 1 07/24/2020    BACTERIA 4+ 07/08/2020    RBCUA 2 07/24/2020    BLOODU Negative 07/24/2020    SPECGRAV 1.023 07/24/2020    GLUCOSEU Negative 07/24/2020       Radiology:  XR CHEST PORTABLE   Final Result   New bilateral ground-glass and interstitial opacities. The appearance is   most consistent with atypical/viral pneumonia         CT Head WO Contrast   Final Result   No acute intracranial abnormality.          MRI BRAIN WO CONTRAST    (Results Pending) Assessment/Plan:    Active Hospital Problems    Diagnosis Date Noted    Change in mental status [R41.82] 07/24/2020       Acute metabolic encephalopathy - unclear etiology. May be 2/2 pneumonia and/or underlying dementia, hypernatremia, or CNS process.  Ammonia level is 44, doubt hepatic enephalopathy  -continue to treat pneumonia as below, COVID-19 test pending  -switch IVF from NS to D5W as hypernatremia is worsening and consult nephrology  -CT Head without contrast showed no acute intracranial abnormality; obtain MRI Brain without contrast given complaint of persistent headache  -continue lactulose  -add Rifaximin    Acute bronchitis due to COVID-19  -stop empiric Azithromycin/ceftriaxone  -droplet plus precautions  -COVID-19 test positive  -check inflammatory markers    Hypernatremia - improved  -changed NS to D5W, continue  -nephrology consulted, recs appreciated    Debility  -PT/OT eval    Severe protein calorie malnutrition  -supplements provided  -Dietitian consult    Alcoholic cirrhosis - appears compensated  -continue to monitor  -continue lactulose      DVT Prophylaxis: Lovenox  Diet: DIET GENERAL;  Code Status: Full Code    PT/OT Eval Status: ordered    Dispo - continue care    Laura Diaz MD

## 2020-07-27 NOTE — PLAN OF CARE
Problem: Falls - Risk of:  Goal: Will remain free from falls  Description: Will remain free from falls  7/27/2020 1218 by Janina Be RN  Outcome: Ongoing  7/27/2020 0314 by Evelyn Jacobson  Outcome: Ongoing  Goal: Absence of physical injury  Description: Absence of physical injury  7/27/2020 1218 by Janina Be RN  Outcome: Ongoing  7/27/2020 0314 by Evelyn Jacobson  Outcome: Ongoing     Problem: Skin Integrity:  Goal: Will show no infection signs and symptoms  Description: Will show no infection signs and symptoms  7/27/2020 1218 by Janina Be RN  Outcome: Ongoing  7/27/2020 0314 by Evelyn Jacobson  Outcome: Ongoing  Goal: Absence of new skin breakdown  Description: Absence of new skin breakdown  7/27/2020 1218 by Janina Be RN  Outcome: Ongoing  7/27/2020 0314 by Evelyn Jacobson  Outcome: Ongoing     Problem: Confusion - Acute:  Goal: Absence of continued neurological deterioration signs and symptoms  Description: Absence of continued neurological deterioration signs and symptoms  7/27/2020 1218 by Janina Be RN  Outcome: Ongoing  7/27/2020 0314 by Evelyn Jacobson  Outcome: Ongoing  Goal: Mental status will be restored to baseline  Description: Mental status will be restored to baseline  7/27/2020 1218 by Janina Be RN  Outcome: Ongoing  7/27/2020 0314 by Evelyn Jacobson  Outcome: Ongoing     Problem: Discharge Planning:  Goal: Ability to perform activities of daily living will improve  Description: Ability to perform activities of daily living will improve  7/27/2020 1218 by Janina Be RN  Outcome: Ongoing  7/27/2020 0314 by Evelyn Jacobson  Outcome: Ongoing  Goal: Participates in care planning  Description: Participates in care planning  7/27/2020 1218 by Janina Be RN  Outcome: Ongoing  7/27/2020 0314 by Evelyn Jacobson  Outcome: Ongoing     Problem: Injury - Risk of, Physical Injury:  Goal: Will remain free from falls  Description: Will remain free from falls  7/27/2020 1218 by Margarita Ferguson RN  Outcome: Ongoing  7/27/2020 0314 by Hermilo Su  Outcome: Ongoing  Goal: Absence of physical injury  Description: Absence of physical injury  7/27/2020 1218 by Margarita Ferguson RN  Outcome: Ongoing  7/27/2020 0314 by Hermilo Su  Outcome: Ongoing     Problem: Mood - Altered:  Goal: Mood stable  Description: Mood stable  7/27/2020 1218 by Margarita Ferguson RN  Outcome: Ongoing  7/27/2020 0314 by Hermilo Su  Outcome: Ongoing  Goal: Absence of abusive behavior  Description: Absence of abusive behavior  7/27/2020 1218 by Margarita Ferguson RN  Outcome: Ongoing  7/27/2020 0314 by Hermilo Su  Outcome: Ongoing  Goal: Verbalizations of feeling emotionally comfortable while being cared for will increase  Description: Verbalizations of feeling emotionally comfortable while being cared for will increase  7/27/2020 1218 by Margarita Ferguson RN  Outcome: Ongoing  7/27/2020 0314 by Hermilo Su  Outcome: Ongoing     Problem: Psychomotor Activity - Altered:  Goal: Absence of psychomotor disturbance signs and symptoms  Description: Absence of psychomotor disturbance signs and symptoms  7/27/2020 1218 by Margarita Ferguson RN  Outcome: Ongoing  7/27/2020 0314 by Hermilo Su  Outcome: Ongoing     Problem: Sensory Perception - Impaired:  Goal: Demonstrations of improved sensory functioning will increase  Description: Demonstrations of improved sensory functioning will increase  7/27/2020 1218 by Margarita Ferguson RN  Outcome: Ongoing  7/27/2020 0314 by Hermilo Su  Outcome: Ongoing  Goal: Decrease in sensory misperception frequency  Description: Decrease in sensory misperception frequency  7/27/2020 1218 by Margarita Ferguson RN  Outcome: Ongoing  7/27/2020 0314 by Hermilo Su  Outcome: Ongoing  Goal: Able to refrain from responding to false sensory perceptions  Description: Able to refrain from responding to false sensory perceptions  7/27/2020 1218 by Vickie Aj RN  Outcome: Ongoing  7/27/2020 0314 by Cl Perrin  Outcome: Ongoing  Goal: Demonstrates accurate environmental perceptions  Description: Demonstrates accurate environmental perceptions  7/27/2020 1218 by Vickie Aj RN  Outcome: Ongoing  7/27/2020 0314 by Cl Perrin  Outcome: Ongoing  Goal: Able to distinguish between reality-based and nonreality-based thinking  Description: Able to distinguish between reality-based and nonreality-based thinking  7/27/2020 1218 by Vickie Aj RN  Outcome: Ongoing  7/27/2020 0314 by Cl Perrin  Outcome: Ongoing  Goal: Able to interrupt nonreality-based thinking  Description: Able to interrupt nonreality-based thinking  7/27/2020 1218 by Vickie Aj RN  Outcome: Ongoing  7/27/2020 0314 by Cl Perrin  Outcome: Ongoing     Problem: Sleep Pattern Disturbance:  Goal: Appears well-rested  Description: Appears well-rested  7/27/2020 1218 by Vickie Aj RN  Outcome: Ongoing  7/27/2020 0314 by Cl Perrin  Outcome: Ongoing     Problem: Restraint Use - Nonviolent/Non-Self-Destructive Behavior:  Goal: Absence of restraint indications  Description: Absence of restraint indications  7/27/2020 1218 by Vickie Aj RN  Outcome: Ongoing  7/27/2020 0314 by Cl Perrin  Outcome: Ongoing  Goal: Absence of restraint-related injury  Description: Absence of restraint-related injury  7/27/2020 1218 by Vickie Aj RN  Outcome: Ongoing  7/27/2020 0314 by Cl Perrin  Outcome: Ongoing     Problem: Airway Clearance - Ineffective  Goal: Achieve or maintain patent airway  Outcome: Ongoing     Problem: Gas Exchange - Impaired  Goal: Absence of hypoxia  Outcome: Ongoing  Goal: Promote optimal lung function  Outcome: Ongoing     Problem: Breathing Pattern - Ineffective  Goal: Ability to achieve and maintain a regular respiratory rate  Outcome: Ongoing     Problem:  Body Temperature - Risk of, Imbalanced  Goal: Ability to maintain a body temperature within defined limits  Outcome: Ongoing  Goal: Will regain or maintain usual level of consciousness  Outcome: Ongoing  Goal: Complications related to the disease process, condition or treatment will be avoided or minimized  Outcome: Ongoing     Problem: Isolation Precautions - Risk of Spread of Infection  Goal: Prevent transmission of infection  Outcome: Ongoing     Problem: Nutrition Deficits  Goal: Optimize nutrtional status  Outcome: Ongoing     Problem: Risk for Fluid Volume Deficit  Goal: Maintain normal heart rhythm  Outcome: Ongoing  Goal: Maintain absence of muscle cramping  Outcome: Ongoing  Goal: Maintain normal serum potassium, sodium, calcium, phosphorus, and pH  Outcome: Ongoing     Problem: Loneliness or Risk for Loneliness  Goal: Demonstrate positive use of time alone when socialization is not possible  Outcome: Ongoing     Problem: Fatigue  Goal: Verbalize increase energy and improved vitality  Outcome: Ongoing     Problem: Patient Education: Go to Patient Education Activity  Goal: Patient/Family Education  Outcome: Ongoing

## 2020-07-28 ENCOUNTER — APPOINTMENT (OUTPATIENT)
Dept: MRI IMAGING | Age: 70
DRG: 177 | End: 2020-07-28
Payer: COMMERCIAL

## 2020-07-28 LAB
A/G RATIO: 0.6 (ref 1.1–2.2)
ALBUMIN SERPL-MCNC: 2.9 G/DL (ref 3.4–5)
ALP BLD-CCNC: 89 U/L (ref 40–129)
ALT SERPL-CCNC: 28 U/L (ref 10–40)
ANION GAP SERPL CALCULATED.3IONS-SCNC: 13 MMOL/L (ref 3–16)
AST SERPL-CCNC: 40 U/L (ref 15–37)
BASOPHILS ABSOLUTE: 0 K/UL (ref 0–0.2)
BASOPHILS RELATIVE PERCENT: 1 %
BILIRUB SERPL-MCNC: 0.5 MG/DL (ref 0–1)
BUN BLDV-MCNC: 9 MG/DL (ref 7–20)
CALCIUM SERPL-MCNC: 8.5 MG/DL (ref 8.3–10.6)
CHLORIDE BLD-SCNC: 106 MMOL/L (ref 99–110)
CO2: 19 MMOL/L (ref 21–32)
CREAT SERPL-MCNC: 0.9 MG/DL (ref 0.8–1.3)
EOSINOPHILS ABSOLUTE: 0.1 K/UL (ref 0–0.6)
EOSINOPHILS RELATIVE PERCENT: 2.3 %
GFR AFRICAN AMERICAN: >60
GFR NON-AFRICAN AMERICAN: >60
GLOBULIN: 4.8 G/DL
GLUCOSE BLD-MCNC: 87 MG/DL (ref 70–99)
HCT VFR BLD CALC: 35.4 % (ref 40.5–52.5)
HEMOGLOBIN: 11.3 G/DL (ref 13.5–17.5)
LYMPHOCYTES ABSOLUTE: 1.3 K/UL (ref 1–5.1)
LYMPHOCYTES RELATIVE PERCENT: 26.4 %
MCH RBC QN AUTO: 27.7 PG (ref 26–34)
MCHC RBC AUTO-ENTMCNC: 31.9 G/DL (ref 31–36)
MCV RBC AUTO: 86.7 FL (ref 80–100)
MONOCYTES ABSOLUTE: 0.6 K/UL (ref 0–1.3)
MONOCYTES RELATIVE PERCENT: 12.2 %
NEUTROPHILS ABSOLUTE: 2.9 K/UL (ref 1.7–7.7)
NEUTROPHILS RELATIVE PERCENT: 58.1 %
PDW BLD-RTO: 16 % (ref 12.4–15.4)
PLATELET # BLD: 294 K/UL (ref 135–450)
PMV BLD AUTO: 8 FL (ref 5–10.5)
POTASSIUM REFLEX MAGNESIUM: 3.6 MMOL/L (ref 3.5–5.1)
RBC # BLD: 4.09 M/UL (ref 4.2–5.9)
SODIUM BLD-SCNC: 138 MMOL/L (ref 136–145)
TOTAL PROTEIN: 7.7 G/DL (ref 6.4–8.2)
WBC # BLD: 5 K/UL (ref 4–11)

## 2020-07-28 PROCEDURE — 80053 COMPREHEN METABOLIC PANEL: CPT

## 2020-07-28 PROCEDURE — 1200000000 HC SEMI PRIVATE

## 2020-07-28 PROCEDURE — 36415 COLL VENOUS BLD VENIPUNCTURE: CPT

## 2020-07-28 PROCEDURE — 85025 COMPLETE CBC W/AUTO DIFF WBC: CPT

## 2020-07-28 PROCEDURE — 94761 N-INVAS EAR/PLS OXIMETRY MLT: CPT

## 2020-07-28 PROCEDURE — 51798 US URINE CAPACITY MEASURE: CPT

## 2020-07-28 PROCEDURE — 6360000002 HC RX W HCPCS: Performed by: INTERNAL MEDICINE

## 2020-07-28 PROCEDURE — 70551 MRI BRAIN STEM W/O DYE: CPT

## 2020-07-28 PROCEDURE — 6370000000 HC RX 637 (ALT 250 FOR IP): Performed by: HOSPITALIST

## 2020-07-28 PROCEDURE — 6370000000 HC RX 637 (ALT 250 FOR IP): Performed by: INTERNAL MEDICINE

## 2020-07-28 PROCEDURE — 6360000002 HC RX W HCPCS: Performed by: HOSPITALIST

## 2020-07-28 PROCEDURE — 2580000003 HC RX 258: Performed by: INTERNAL MEDICINE

## 2020-07-28 RX ORDER — PANTOPRAZOLE SODIUM 40 MG/1
40 TABLET, DELAYED RELEASE ORAL
Status: DISCONTINUED | OUTPATIENT
Start: 2020-07-29 | End: 2020-07-30 | Stop reason: HOSPADM

## 2020-07-28 RX ADMIN — PANTOPRAZOLE SODIUM 20 MG: 20 TABLET, DELAYED RELEASE ORAL at 08:08

## 2020-07-28 RX ADMIN — HALOPERIDOL LACTATE 2 MG: 5 INJECTION, SOLUTION INTRAMUSCULAR at 18:35

## 2020-07-28 RX ADMIN — LACTULOSE 20 G: 20 SOLUTION ORAL at 08:08

## 2020-07-28 RX ADMIN — DEXTROSE MONOHYDRATE: 50 INJECTION, SOLUTION INTRAVENOUS at 04:26

## 2020-07-28 RX ADMIN — RIFAXIMIN 550 MG: 550 TABLET ORAL at 08:07

## 2020-07-28 RX ADMIN — ENOXAPARIN SODIUM 40 MG: 40 INJECTION SUBCUTANEOUS at 08:07

## 2020-07-28 RX ADMIN — CHLORPROMAZINE HYDROCHLORIDE 25 MG: 25 TABLET, SUGAR COATED ORAL at 08:10

## 2020-07-28 RX ADMIN — QUETIAPINE FUMARATE 25 MG: 25 TABLET ORAL at 08:07

## 2020-07-28 ASSESSMENT — PAIN SCALES - PAIN ASSESSMENT IN ADVANCED DEMENTIA (PAINAD)
CONSOLABILITY: 0
NEGVOCALIZATION: 0
NEGVOCALIZATION: 0
TOTALSCORE: 0
CONSOLABILITY: 0
TOTALSCORE: 0
BODYLANGUAGE: 0
FACIALEXPRESSION: 0
CONSOLABILITY: 0
BODYLANGUAGE: 0
FACIALEXPRESSION: 0
TOTALSCORE: 0
BODYLANGUAGE: 0
BREATHING: 0
BODYLANGUAGE: 0
NEGVOCALIZATION: 0
BODYLANGUAGE: 0
NEGVOCALIZATION: 0
TOTALSCORE: 0
BODYLANGUAGE: 0
CONSOLABILITY: 0
NEGVOCALIZATION: 0
CONSOLABILITY: 0
NEGVOCALIZATION: 0
BREATHING: 0
NEGVOCALIZATION: 0
TOTALSCORE: 0
CONSOLABILITY: 0
FACIALEXPRESSION: 0
FACIALEXPRESSION: 0
BREATHING: 0
CONSOLABILITY: 0
FACIALEXPRESSION: 0
CONSOLABILITY: 0
FACIALEXPRESSION: 0
FACIALEXPRESSION: 0
BREATHING: 0
NEGVOCALIZATION: 0
TOTALSCORE: 0
TOTALSCORE: 0
CONSOLABILITY: 0
BODYLANGUAGE: 0
FACIALEXPRESSION: 0
CONSOLABILITY: 0
BREATHING: 0
BODYLANGUAGE: 0
BODYLANGUAGE: 0
NEGVOCALIZATION: 0
BREATHING: 0
NEGVOCALIZATION: 0
TOTALSCORE: 0
TOTALSCORE: 0
BREATHING: 0
FACIALEXPRESSION: 0
CONSOLABILITY: 0
TOTALSCORE: 0
BODYLANGUAGE: 0
NEGVOCALIZATION: 0
BREATHING: 0
BREATHING: 0
TOTALSCORE: 0
BODYLANGUAGE: 0
BREATHING: 0
FACIALEXPRESSION: 0
BREATHING: 0
FACIALEXPRESSION: 0

## 2020-07-28 ASSESSMENT — PAIN SCALES - GENERAL: PAINLEVEL_OUTOF10: 0

## 2020-07-28 ASSESSMENT — PAIN SCALES - WONG BAKER: WONGBAKER_NUMERICALRESPONSE: 0

## 2020-07-28 NOTE — PROGRESS NOTES
Hospitalist Progress Note      PCP: No primary care provider on file. Date of Admission: 7/24/2020    Chief Complaint: altered mental status    Hospital Course: The patient Genevieve Landry is a 79 y.o.male With medical history significant for alcohol abuse and cirrhosis of the liver and anemia and dementia and hypertension  Patient brought to the emergency room for generalized weakness and further deterioration in mental status in a patient with dementia. Subjective: Pt seen and examined. Still confused. Got himself out of restraints and removed another IV today. Placed back in restraints this morning. He has no complaints other than some reflux. Medications:  Reviewed    Infusion Medications     Scheduled Medications    [START ON 7/29/2020] pantoprazole  40 mg Oral QAM AC    QUEtiapine  25 mg Oral BID    rifaximin  550 mg Oral BID    chlorproMAZINE  25 mg Oral 4x Daily    lactulose  30 mL Oral 4x Daily    sodium chloride flush  10 mL Intravenous 2 times per day    enoxaparin  40 mg Subcutaneous Daily     PRN Meds: haloperidol lactate, acetaminophen, bisacodyl, fleet, sodium chloride flush, potassium chloride **OR** potassium alternative oral replacement **OR** potassium chloride, acetaminophen **OR** acetaminophen, polyethylene glycol, promethazine **OR** ondansetron      Intake/Output Summary (Last 24 hours) at 7/28/2020 1119  Last data filed at 7/28/2020 0646  Gross per 24 hour   Intake 5846 ml   Output 100 ml   Net 5746 ml       Exam:    /86   Pulse 96   Temp 98.2 °F (36.8 °C) (Oral)   Resp 18   Wt 158 lb 1.1 oz (71.7 kg)   SpO2 96%   BMI 21.44 kg/m²     General appearance: In wrist restraints and Stanislaus Vest. No apparent distress, agitated. HEENT: Pupils equal, round, and reactive to light. Conjunctivae/corneas clear. Neck: Supple, with full range of motion. No jugular venous distention. Trachea midline. Respiratory:  Normal respiratory effort.  Clear to auscultation, bilaterally without Rales/Wheezes/Rhonchi. Cardiovascular: Regular rate and rhythm with normal S1/S2 without murmurs, rubs or gallops. Abdomen: Soft, non-tender, non-distended with normal bowel sounds. Musculoskeletal: No clubbing, cyanosis or edema bilaterally. Full range of motion without deformity. Skin: Skin color, texture, turgor normal.  No rashes or lesions. Neurologic:  Neurovascularly intact without any focal sensory/motor deficits. Cranial nerves: II-XII intact, grossly non-focal.  Psychiatric: Alert and oriented to self only  Capillary Refill: Brisk,< 3 seconds   Peripheral Pulses: +2 palpable, equal bilaterally       Labs:   Recent Labs     07/26/20  0635 07/27/20  0946 07/28/20  0603   WBC 4.8 3.7* 5.0   HGB 11.3* 11.9* 11.3*   HCT 35.5* 37.2* 35.4*    366 294     Recent Labs     07/26/20  0635 07/27/20  0946 07/28/20  0603   * 144 138   K 4.1 4.1 3.6   * 110 106   CO2 18* 20* 19*   BUN 20 11 9   CREATININE 0.9 0.9 0.9   CALCIUM 8.4 8.9 8.5     Recent Labs     07/26/20  0635 07/27/20  0946 07/28/20  0603   AST 39* 36 40*   ALT 22 22 28   BILITOT 0.5 0.5 0.5   ALKPHOS 80 87 89     No results for input(s): INR in the last 72 hours. No results for input(s): Thelda Rough in the last 72 hours. Urinalysis:      Lab Results   Component Value Date    NITRU Negative 07/24/2020    WBCUA 1 07/24/2020    BACTERIA 4+ 07/08/2020    RBCUA 2 07/24/2020    BLOODU Negative 07/24/2020    SPECGRAV 1.023 07/24/2020    GLUCOSEU Negative 07/24/2020       Radiology:  XR CHEST PORTABLE   Final Result   New bilateral ground-glass and interstitial opacities. The appearance is   most consistent with atypical/viral pneumonia         CT Head WO Contrast   Final Result   No acute intracranial abnormality.          MRI BRAIN WO CONTRAST    (Results Pending)           Assessment/Plan:    Active Hospital Problems    Diagnosis Date Noted    Change in mental status [R41.82] 07/24/2020       Acute metabolic encephalopathy - unclear etiology. May be 2/2 pneumonia and/or underlying dementia, hypernatremia, or CNS process.  Ammonia level is 44, doubt hepatic enephalopathy  -continue to treat pneumonia as below, COVID-19 test pending  -switch IVF from NS to D5W as hypernatremia is worsening and consult nephrology  -CT Head without contrast showed no acute intracranial abnormality; obtain MRI Brain without contrast given complaint of persistent headache  -continue lactulose  -add Rifaximin    Acute bronchitis due to COVID-19  -stop empiric Azithromycin/ceftriaxone  -droplet plus precautions  -COVID-19 test positive  -check inflammatory markers    Hypernatremia - improved  -changed NS to D5W, stop today  -nephrology consulted, recs appreciated    Debility  -PT/OT eval    Severe protein calorie malnutrition  -supplements provided  -Dietitian consult    Alcoholic cirrhosis - appears compensated  -continue to monitor  -continue lactulose      DVT Prophylaxis: Lovenox  Diet: DIET GENERAL;  Code Status: Full Code    PT/OT Eval Status: ordered    Dispo - continue care    Risa Chang MD

## 2020-07-28 NOTE — PROGRESS NOTES
Asked to call [address] [place] [address] radiology to get permission for MRI Brain without contrast.    I already contacted [address] [place] [address] radiology on 7/27 and spoke to neurology on 7/27 and gotten approval for the MRI scan. Please see signed significant event note on 7/27.     Tommy Price MD, MPH  Hospitalist  Pager: [phone]

## 2020-07-28 NOTE — PROGRESS NOTES
Patient is oriented to self only, disoriented x3, patient in 2 point wrist restraints and posey vest, no physical injury noted,patient very confused and continues to try and get out of bed, redirection unsuccessful by telecam, patient is also refusing LAB to collect his blood, call light within reach, bed/chair alarm on. AM meds complete, patient tolerated well. VSS and WDL. No s/s of distress, no further needs noted at this time.  Electronically signed by Niesha Buchanan RN on 7/28/2020 at 11:00 AM

## 2020-07-28 NOTE — PROGRESS NOTES
Admit Date: 7/24/2020      REASON FOR ADMISSION:    Failure to thrive.     REASON FOR CONSULTATION:   Hypernatremia. INTERVAL HISTORY  Pt resting this morning. Was agitated last night, trying to get out of bed, currently  in soft restraints. Still very confused and disoriented. Na 156-->148--> 144--> 138  Urine no record , incontinent  He is clinically dry   Covid positive but not on oxygen. Mild anemia. PLAN  D./c IVFs. Po intake improving. COVID positive     Hypernatremia  due to poor p.o. intake. D5W and monitor. Would expect improvement. Possible COVID  Result pending     chest x-ray shows atypical viral pneumonia. Alcoholic cirrhosis   seems to be compensated. Management as per team.    H/O Dementia       [facility]  59-year-old gentleman with  history of alcohol abuse, cirrhosis, dementia, presents with generalized  weakness. The patient was unable to provide any history. He was found  to have a sodium of 152 which has now gone up to 155.     He is also being checked for COVID. Subjective:   Patient has no complaints. Confused and agitated at times. Still in restraints. Review of Systems  Seen in room     Objective:     Patient Vitals for the past 8 hrs:   BP Temp Temp src Pulse Resp SpO2   07/28/20 1000 124/86 98.2 °F (36.8 °C) Oral 96 18 96 %   07/28/20 0827 132/80 98 °F (36.7 °C) Oral 90 18 94 %   07/28/20 0800 -- -- -- 88 -- --   07/28/20 0646 134/86 98.1 °F (36.7 °C) Oral 94 18 93 %       I/O last 3 completed shifts: In: 5946 [P.O.:580; I.V.:5366]  Out: 100 [Urine:100]        General appearance:resting    Due to the current efforts to prevent transmission of COVID-19 and also the need to preserve PPE for other caregivers, a face-to-face encounter with the patient was not performed. That being said, all relevant records and diagnostic tests were reviewed, including laboratory results and imaging. Please reference any relevant documentation elsewhere.  Care will be coordinated with the primary service. Will Moreno korey  Lab Results   Component Value Date    CREATININE 0.9 07/28/2020    BUN 9 07/28/2020     07/28/2020    K 3.6 07/28/2020     07/28/2020    CO2 19 (L) 07/28/2020     Lab Results   Component Value Date    WBC 5.0 07/28/2020    HGB 11.3 (L) 07/28/2020    HCT 35.4 (L) 07/28/2020    MCV 86.7 07/28/2020     07/28/2020            AGLUCOSE)Magnesium:    Lab Results   Component Value Date    MG 3.60 07/24/2020     Phosphorus:    Lab Results   Component Value Date    PHOS 3.6 07/24/2020       Uric Acid:  No components found for: URIC    Active Problems:    Change in mental status  Resolved Problems:    * No resolved hospital problems.  *

## 2020-07-28 NOTE — PLAN OF CARE
Problem: Falls - Risk of:  Goal: Will remain free from falls  Description: Will remain free from falls  Outcome: Ongoing     Problem: Skin Integrity:  Goal: Will show no infection signs and symptoms  Description: Will show no infection signs and symptoms  Outcome: Ongoing     Problem: Confusion - Acute:  Goal: Absence of continued neurological deterioration signs and symptoms  Description: Absence of continued neurological deterioration signs and symptoms  Outcome: Ongoing     Problem: Injury - Risk of, Physical Injury:  Goal: Will remain free from falls  Description: Will remain free from falls  Outcome: Ongoing     Problem: Restraint Use - Nonviolent/Non-Self-Destructive Behavior:  Goal: Absence of restraint indications  Description: Absence of restraint indications  Outcome: Ongoing  Goal: Absence of restraint-related injury  Description: Absence of restraint-related injury  Outcome: Ongoing

## 2020-07-28 NOTE — PROGRESS NOTES
Around 2115 patient had untied himself from bilateral wrist restraints and IV was out, tele-camera was in room at this time. Patient repositioned, vest and bilateral wrist restraints were tied, and new IV placed in left hand.

## 2020-07-29 LAB
A/G RATIO: 0.6 (ref 1.1–2.2)
ALBUMIN SERPL-MCNC: 3.1 G/DL (ref 3.4–5)
ALP BLD-CCNC: 101 U/L (ref 40–129)
ALT SERPL-CCNC: 29 U/L (ref 10–40)
ANION GAP SERPL CALCULATED.3IONS-SCNC: 13 MMOL/L (ref 3–16)
AST SERPL-CCNC: 38 U/L (ref 15–37)
BASOPHILS ABSOLUTE: 0.1 K/UL (ref 0–0.2)
BASOPHILS RELATIVE PERCENT: 1 %
BILIRUB SERPL-MCNC: 0.5 MG/DL (ref 0–1)
BUN BLDV-MCNC: 8 MG/DL (ref 7–20)
CALCIUM SERPL-MCNC: 9.2 MG/DL (ref 8.3–10.6)
CHLORIDE BLD-SCNC: 105 MMOL/L (ref 99–110)
CO2: 19 MMOL/L (ref 21–32)
CREAT SERPL-MCNC: 0.8 MG/DL (ref 0.8–1.3)
EOSINOPHILS ABSOLUTE: 0.1 K/UL (ref 0–0.6)
EOSINOPHILS RELATIVE PERCENT: 2.2 %
GFR AFRICAN AMERICAN: >60
GFR NON-AFRICAN AMERICAN: >60
GLOBULIN: 5.4 G/DL
GLUCOSE BLD-MCNC: 105 MG/DL (ref 70–99)
HCT VFR BLD CALC: 38 % (ref 40.5–52.5)
HEMOGLOBIN: 12.4 G/DL (ref 13.5–17.5)
LYMPHOCYTES ABSOLUTE: 1.3 K/UL (ref 1–5.1)
LYMPHOCYTES RELATIVE PERCENT: 22.7 %
MCH RBC QN AUTO: 28.1 PG (ref 26–34)
MCHC RBC AUTO-ENTMCNC: 32.7 G/DL (ref 31–36)
MCV RBC AUTO: 85.9 FL (ref 80–100)
MONOCYTES ABSOLUTE: 0.5 K/UL (ref 0–1.3)
MONOCYTES RELATIVE PERCENT: 7.8 %
NEUTROPHILS ABSOLUTE: 3.9 K/UL (ref 1.7–7.7)
NEUTROPHILS RELATIVE PERCENT: 66.3 %
PDW BLD-RTO: 15.8 % (ref 12.4–15.4)
PLATELET # BLD: 353 K/UL (ref 135–450)
PMV BLD AUTO: 8.4 FL (ref 5–10.5)
POTASSIUM REFLEX MAGNESIUM: 3.8 MMOL/L (ref 3.5–5.1)
RBC # BLD: 4.43 M/UL (ref 4.2–5.9)
SODIUM BLD-SCNC: 137 MMOL/L (ref 136–145)
TOTAL PROTEIN: 8.5 G/DL (ref 6.4–8.2)
WBC # BLD: 5.9 K/UL (ref 4–11)

## 2020-07-29 PROCEDURE — 94760 N-INVAS EAR/PLS OXIMETRY 1: CPT

## 2020-07-29 PROCEDURE — 6370000000 HC RX 637 (ALT 250 FOR IP): Performed by: INTERNAL MEDICINE

## 2020-07-29 PROCEDURE — 80053 COMPREHEN METABOLIC PANEL: CPT

## 2020-07-29 PROCEDURE — 6360000002 HC RX W HCPCS: Performed by: INTERNAL MEDICINE

## 2020-07-29 PROCEDURE — 6370000000 HC RX 637 (ALT 250 FOR IP): Performed by: HOSPITALIST

## 2020-07-29 PROCEDURE — 85025 COMPLETE CBC W/AUTO DIFF WBC: CPT

## 2020-07-29 PROCEDURE — 1200000000 HC SEMI PRIVATE

## 2020-07-29 PROCEDURE — 6360000002 HC RX W HCPCS: Performed by: HOSPITALIST

## 2020-07-29 PROCEDURE — 2580000003 HC RX 258: Performed by: HOSPITALIST

## 2020-07-29 RX ADMIN — HALOPERIDOL LACTATE 2 MG: 5 INJECTION, SOLUTION INTRAMUSCULAR at 13:48

## 2020-07-29 RX ADMIN — SODIUM CHLORIDE, PRESERVATIVE FREE 10 ML: 5 INJECTION INTRAVENOUS at 10:04

## 2020-07-29 RX ADMIN — ENOXAPARIN SODIUM 40 MG: 40 INJECTION SUBCUTANEOUS at 10:03

## 2020-07-29 RX ADMIN — CHLORPROMAZINE HYDROCHLORIDE 25 MG: 25 TABLET, SUGAR COATED ORAL at 17:20

## 2020-07-29 RX ADMIN — HALOPERIDOL LACTATE 2 MG: 5 INJECTION, SOLUTION INTRAMUSCULAR at 01:00

## 2020-07-29 RX ADMIN — LACTULOSE 20 G: 20 SOLUTION ORAL at 10:03

## 2020-07-29 RX ADMIN — LACTULOSE 20 G: 20 SOLUTION ORAL at 17:20

## 2020-07-29 RX ADMIN — CHLORPROMAZINE HYDROCHLORIDE 25 MG: 25 TABLET, SUGAR COATED ORAL at 10:03

## 2020-07-29 RX ADMIN — QUETIAPINE FUMARATE 25 MG: 25 TABLET ORAL at 10:03

## 2020-07-29 RX ADMIN — RIFAXIMIN 550 MG: 550 TABLET ORAL at 10:03

## 2020-07-29 RX ADMIN — CHLORPROMAZINE HYDROCHLORIDE 25 MG: 25 TABLET, SUGAR COATED ORAL at 13:22

## 2020-07-29 ASSESSMENT — PAIN SCALES - GENERAL
PAINLEVEL_OUTOF10: 0

## 2020-07-29 ASSESSMENT — PAIN SCALES - WONG BAKER
WONGBAKER_NUMERICALRESPONSE: 0
WONGBAKER_NUMERICALRESPONSE: 0

## 2020-07-29 NOTE — PROGRESS NOTES
Patient remains confused, pulling on line, able to get our of restraints, attempt to get out  of bed. Charge nurse placed patient in 5 pontis restraints, Provider was notified, Order was paled in epic.

## 2020-07-29 NOTE — PROGRESS NOTES
Admit Date: 7/24/2020      REASON FOR ADMISSION:    Failure to thrive.     REASON FOR CONSULTATION:   Hypernatremia. INTERVAL HISTORY  Pt resting this morning. Needed Haldol this afternoon. Earlier ate and drank good. Still confused. Patient not examined due to COVID restrictions and to conserve PPE. Na 156-->148--> 144--> 137  Urine no record , incontinent, per nurse he had 3 large incontinent episodes since this morning. Mild anemia. PLAN  Hypernatremia resolved. MICHEL resolved. We will sign off. Please call us with questions. Discussed with the nurse. Hypernatremia  due to poor p.o. intake. D5W and monitor. Would expect improvement. Possible COVID  Result pending     chest x-ray shows atypical viral pneumonia. Alcoholic cirrhosis   seems to be compensated. Management as per team.    H/O Dementia       [facility]  72-year-old gentleman with  history of alcohol abuse, cirrhosis, dementia, presents with generalized  weakness. The patient was unable to provide any history. He was found  to have a sodium of 152 which has now gone up to 155.     He is also being checked for COVID. Subjective:   Patient has no complaints. Confused and agitated at times. Still in restraints. Review of Systems  Seen in room     Objective:     Patient Vitals for the past 8 hrs:   BP Temp Temp src Pulse Resp SpO2   07/29/20 1730 120/72 98 °F (36.7 °C) Oral 79 18 94 %       I/O last 3 completed shifts: In: 5 [P.O.:540]  Out: -         General appearance:resting    Due to the current efforts to prevent transmission of COVID-19 and also the need to preserve PPE for other caregivers, a face-to-face encounter with the patient was not performed. That being said, all relevant records and diagnostic tests were reviewed, including laboratory results and imaging. Please reference any relevant documentation elsewhere. Care will be coordinated with the primary service. Chelsea nair  Lab Results Component Value Date    CREATININE 0.8 07/29/2020    BUN 8 07/29/2020     07/29/2020    K 3.8 07/29/2020     07/29/2020    CO2 19 (L) 07/29/2020     Lab Results   Component Value Date    WBC 5.9 07/29/2020    HGB 12.4 (L) 07/29/2020    HCT 38.0 (L) 07/29/2020    MCV 85.9 07/29/2020     07/29/2020            AGLUCOSE)Magnesium:    Lab Results   Component Value Date    MG 3.60 07/24/2020     Phosphorus:    Lab Results   Component Value Date    PHOS 3.6 07/24/2020       Uric Acid:  No components found for: URIC    Active Problems:    Change in mental status  Resolved Problems:    * No resolved hospital problems.  *

## 2020-07-29 NOTE — PROGRESS NOTES
Pt continues to remain calm. Fall precautions in place. Tele cam in place.  Electronically signed by Maynor Purdy RN on 7/29/2020 at 12:07 PM

## 2020-07-29 NOTE — Clinical Note
Patient remains confused, trying to get out of bed, trying to pull lines and IV. Restraints will remain.

## 2020-07-29 NOTE — PROGRESS NOTES
Pt resting in bed. Currently in 5 point restraints, pt had been pulling on lines and broke heart monitor leads. Also aggressive towards staff. Haldol 2mg IM given at 0100, pt resting comfortably ever since. Fall precautions in place, call light and bedside table within reach.

## 2020-07-29 NOTE — PROGRESS NOTES
Hospitalist Progress Note      PCP: No primary care provider on file. Date of Admission: 7/24/2020    Chief Complaint: altered mental status    Hospital Course: The patient Gsu Rajan is a 79 y.o.male With medical history significant for alcohol abuse and cirrhosis of the liver and anemia and dementia and hypertension  Patient brought to the emergency room for generalized weakness and further deterioration in mental status in a patient with dementia. Subjective: Patient seen and examined today. He still confused unknown baseline to me. Review of system limited due to clinical condition. He is off of the restraints. Medications:  Reviewed    Infusion Medications     Scheduled Medications    pantoprazole  40 mg Oral QAM AC    QUEtiapine  25 mg Oral BID    rifaximin  550 mg Oral BID    chlorproMAZINE  25 mg Oral 4x Daily    lactulose  30 mL Oral 4x Daily    sodium chloride flush  10 mL Intravenous 2 times per day    enoxaparin  40 mg Subcutaneous Daily     PRN Meds: haloperidol lactate, acetaminophen, bisacodyl, fleet, sodium chloride flush, potassium chloride **OR** potassium alternative oral replacement **OR** potassium chloride, acetaminophen **OR** acetaminophen, polyethylene glycol, promethazine **OR** ondansetron      Intake/Output Summary (Last 24 hours) at 7/29/2020 1504  Last data filed at 7/29/2020 1350  Gross per 24 hour   Intake 540 ml   Output --   Net 540 ml       Exam:    /70   Pulse 78   Temp 98 °F (36.7 °C) (Oral)   Resp 18   Wt 163 lb 12.8 oz (74.3 kg)   SpO2 94%   BMI 22.22 kg/m²     General appearance:No apparent distress,   HEENT: Pupils equal, round, and reactive to light. Conjunctivae/corneas clear. Neck: Supple, with full range of motion. No jugular venous distention. Trachea midline. Respiratory:  Normal respiratory effort. Clear to auscultation, bilaterally without Rales/Wheezes/Rhonchi.   Cardiovascular: Regular rate and rhythm with normal S1/S2 without murmurs, rubs or gallops. Abdomen: Soft, non-tender, non-distended with normal bowel sounds. Musculoskeletal: No clubbing, cyanosis or edema bilaterally. Full range of motion without deformity. Skin: Skin color, texture, turgor normal.  No rashes or lesions. Neurologic:  Neurovascularly intact without any focal sensory/motor deficits. grossly non-focal.  Psychiatric: Alert and oriented to self only  Capillary Refill: Brisk,< 3 seconds   Peripheral Pulses: +2 palpable, equal bilaterally       Labs:   Recent Labs     07/27/20  0946 07/28/20  0603 07/29/20  0821   WBC 3.7* 5.0 5.9   HGB 11.9* 11.3* 12.4*   HCT 37.2* 35.4* 38.0*    294 353     Recent Labs     07/27/20  0946 07/28/20 0603 07/29/20  0821    138 137   K 4.1 3.6 3.8    106 105   CO2 20* 19* 19*   BUN 11 9 8   CREATININE 0.9 0.9 0.8   CALCIUM 8.9 8.5 9.2     Recent Labs     07/27/20  0946 07/28/20  0603 07/29/20  0821   AST 36 40* 38*   ALT 22 28 29   BILITOT 0.5 0.5 0.5   ALKPHOS 87 89 101     No results for input(s): INR in the last 72 hours. No results for input(s): Burnadette Lundborg in the last 72 hours. Urinalysis:      Lab Results   Component Value Date    NITRU Negative 07/24/2020    WBCUA 1 07/24/2020    BACTERIA 4+ 07/08/2020    RBCUA 2 07/24/2020    BLOODU Negative 07/24/2020    SPECGRAV 1.023 07/24/2020    GLUCOSEU Negative 07/24/2020       Radiology:  MRI BRAIN WO CONTRAST   Final Result   Generalized involutional changes and chronic small ischemic disease. No   acute stroke midline shift or mass effect. XR CHEST PORTABLE   Final Result   New bilateral ground-glass and interstitial opacities. The appearance is   most consistent with atypical/viral pneumonia         CT Head WO Contrast   Final Result   No acute intracranial abnormality.                  Assessment/Plan:    Active Hospital Problems    Diagnosis Date Noted    Change in mental status [R41.82] 07/24/2020       Acute metabolic

## 2020-07-29 NOTE — PLAN OF CARE
Problem: Falls - Risk of:  Goal: Will remain free from falls  Description: Will remain free from falls  7/29/2020 1208 by Lauren Morrison RN  Outcome: Ongoing  Note: Fall risk assessment completed. Fall precautions in place. Call light within reach. Pt educated on calling for assistance before getting up. Walkway free of clutter. Will continue to monitor. Problem: Skin Integrity:  Goal: Will show no infection signs and symptoms  Description: Will show no infection signs and symptoms  7/29/2020 1208 by Lauren Morrison RN  Outcome: Ongoing  Note: Nasir assessed. Skin assessed. Pt turned/repositioned q2h to prevent skin breakdown. No new skin issues to note. Problem: Confusion - Acute:  Goal: Absence of continued neurological deterioration signs and symptoms  Description: Absence of continued neurological deterioration signs and symptoms  7/29/2020 1208 by Lauren Morrison RN  Outcome: Ongoing  Note: Pt with dementia. Pt off baseline mentation. Pt reoriented as able. Fall precautions in place. Tele cam in place. Medications administered as directed. Problem: Injury - Risk of, Physical Injury:  Goal: Will remain free from falls  Description: Will remain free from falls  7/29/2020 1208 by Lauren Morrison RN  Outcome: Ongoing  Note: Fall risk assessment completed. Fall precautions in place. Call light within reach. Pt educated on calling for assistance before getting up. Walkway free of clutter. Will continue to monitor.         Problem: Airway Clearance - Ineffective  Goal: Achieve or maintain patent airway  7/29/2020 1208 by Lauren Morrison RN  Outcome: Ongoing     Problem: Restraint Use - Nonviolent/Non-Self-Destructive Behavior:  Goal: Absence of restraint-related injury  Description: Absence of restraint-related injury  7/29/2020 1208 by Lauren Morrison RN  Outcome: Ongoing     Problem: Discharge Planning:  Goal: Ability to perform activities of daily living will improve  Description: Ability to perform activities of daily living will improve  7/29/2020 1208 by Vesna Pagan RN  Outcome: Ongoing  Note: Plan to dc to SNF. SW following for dc needs.

## 2020-07-29 NOTE — PLAN OF CARE
Problem: Falls - Risk of:  Goal: Will remain free from falls  Description: Will remain free from falls  Outcome: Ongoing  Note: Fall risk assessment completed. Fall precautions in place. Call light within reach. Pt educated on calling for assistance before getting up. Walkway free of clutter. Will continue to monitor. Goal: Absence of physical injury  Description: Absence of physical injury  Outcome: Ongoing  Note: Pt is free of injury. No injury noted. Fall precautions in place. Call light within reach. Will monitor. Problem: Skin Integrity:  Goal: Will show no infection signs and symptoms  Description: Will show no infection signs and symptoms  Outcome: Ongoing  Goal: Absence of new skin breakdown  Description: Absence of new skin breakdown  Outcome: Ongoing     Problem: Confusion - Acute:  Goal: Absence of continued neurological deterioration signs and symptoms  Description: Absence of continued neurological deterioration signs and symptoms  Outcome: Ongoing  Goal: Mental status will be restored to baseline  Description: Mental status will be restored to baseline  Outcome: Ongoing     Problem: Discharge Planning:  Goal: Ability to perform activities of daily living will improve  Description: Ability to perform activities of daily living will improve  Outcome: Ongoing  Goal: Participates in care planning  Description: Participates in care planning  Outcome: Ongoing     Problem: Injury - Risk of, Physical Injury:  Goal: Will remain free from falls  Description: Will remain free from falls  Outcome: Ongoing  Note: Fall risk assessment completed. Fall precautions in place. Call light within reach. Pt educated on calling for assistance before getting up. Walkway free of clutter. Will continue to monitor. Goal: Absence of physical injury  Description: Absence of physical injury  Outcome: Ongoing  Note: Pt is free of injury. No injury noted. Fall precautions in place. Call light within reach. Will monitor. Problem: Mood - Altered:  Goal: Mood stable  Description: Mood stable  Outcome: Ongoing  Goal: Absence of abusive behavior  Description: Absence of abusive behavior  Outcome: Ongoing  Goal: Verbalizations of feeling emotionally comfortable while being cared for will increase  Description: Verbalizations of feeling emotionally comfortable while being cared for will increase  Outcome: Ongoing     Problem: Psychomotor Activity - Altered:  Goal: Absence of psychomotor disturbance signs and symptoms  Description: Absence of psychomotor disturbance signs and symptoms  Outcome: Ongoing     Problem: Sensory Perception - Impaired:  Goal: Demonstrations of improved sensory functioning will increase  Description: Demonstrations of improved sensory functioning will increase  Outcome: Ongoing  Goal: Decrease in sensory misperception frequency  Description: Decrease in sensory misperception frequency  Outcome: Ongoing  Goal: Able to refrain from responding to false sensory perceptions  Description: Able to refrain from responding to false sensory perceptions  Outcome: Ongoing  Goal: Demonstrates accurate environmental perceptions  Description: Demonstrates accurate environmental perceptions  Outcome: Ongoing  Goal: Able to distinguish between reality-based and nonreality-based thinking  Description: Able to distinguish between reality-based and nonreality-based thinking  Outcome: Ongoing  Goal: Able to interrupt nonreality-based thinking  Description: Able to interrupt nonreality-based thinking  Outcome: Ongoing     Problem: Sleep Pattern Disturbance:  Goal: Appears well-rested  Description: Appears well-rested  Outcome: Ongoing     Problem: Restraint Use - Nonviolent/Non-Self-Destructive Behavior:  Goal: Absence of restraint indications  Description: Absence of restraint indications  Outcome: Ongoing  Goal: Absence of restraint-related injury  Description: Absence of restraint-related injury  Outcome: Ongoing     Problem: Airway Clearance - Ineffective  Goal: Achieve or maintain patent airway  Outcome: Ongoing     Problem: Gas Exchange - Impaired  Goal: Absence of hypoxia  Outcome: Ongoing  Goal: Promote optimal lung function  Outcome: Ongoing     Problem: Breathing Pattern - Ineffective  Goal: Ability to achieve and maintain a regular respiratory rate  Outcome: Ongoing     Problem:  Body Temperature -  Risk of, Imbalanced  Goal: Ability to maintain a body temperature within defined limits  Outcome: Ongoing  Goal: Will regain or maintain usual level of consciousness  Outcome: Ongoing  Goal: Complications related to the disease process, condition or treatment will be avoided or minimized  Outcome: Ongoing     Problem: Isolation Precautions - Risk of Spread of Infection  Goal: Prevent transmission of infection  Outcome: Ongoing     Problem: Nutrition Deficits  Goal: Optimize nutrtional status  Outcome: Ongoing     Problem: Risk for Fluid Volume Deficit  Goal: Maintain normal heart rhythm  Outcome: Ongoing  Goal: Maintain absence of muscle cramping  Outcome: Ongoing  Goal: Maintain normal serum potassium, sodium, calcium, phosphorus, and pH  Outcome: Ongoing     Problem: Loneliness or Risk for Loneliness  Goal: Demonstrate positive use of time alone when socialization is not possible  Outcome: Ongoing     Problem: Fatigue  Goal: Verbalize increase energy and improved vitality  Outcome: Ongoing     Problem: Patient Education: Go to Patient Education Activity  Goal: Patient/Family Education  Outcome: Ongoing

## 2020-07-29 NOTE — PROGRESS NOTES
Pt getting a little more agitated as the day progresses. Haldol administered as directed. Scheduled meds given. Tele cam remains in place. Fall precautions in place. Will monitor.  Electronically signed by Chantale Dixon RN on 7/29/2020 at 2:41 PM

## 2020-07-30 VITALS
OXYGEN SATURATION: 95 % | BODY MASS INDEX: 22.42 KG/M2 | HEART RATE: 90 BPM | TEMPERATURE: 98 F | WEIGHT: 165.34 LBS | SYSTOLIC BLOOD PRESSURE: 132 MMHG | DIASTOLIC BLOOD PRESSURE: 82 MMHG | RESPIRATION RATE: 16 BRPM

## 2020-07-30 PROBLEM — U07.1 COVID-19: Status: ACTIVE | Noted: 2020-07-30

## 2020-07-30 LAB
A/G RATIO: 0.6 (ref 1.1–2.2)
ALBUMIN SERPL-MCNC: 3 G/DL (ref 3.4–5)
ALP BLD-CCNC: 102 U/L (ref 40–129)
ALT SERPL-CCNC: 28 U/L (ref 10–40)
ANION GAP SERPL CALCULATED.3IONS-SCNC: 16 MMOL/L (ref 3–16)
AST SERPL-CCNC: 36 U/L (ref 15–37)
BASOPHILS ABSOLUTE: 0.1 K/UL (ref 0–0.2)
BASOPHILS RELATIVE PERCENT: 1 %
BILIRUB SERPL-MCNC: 0.4 MG/DL (ref 0–1)
BUN BLDV-MCNC: 8 MG/DL (ref 7–20)
CALCIUM SERPL-MCNC: 9 MG/DL (ref 8.3–10.6)
CHLORIDE BLD-SCNC: 101 MMOL/L (ref 99–110)
CO2: 22 MMOL/L (ref 21–32)
CREAT SERPL-MCNC: 0.8 MG/DL (ref 0.8–1.3)
D DIMER: 2678 NG/ML DDU (ref 0–229)
EOSINOPHILS ABSOLUTE: 0.1 K/UL (ref 0–0.6)
EOSINOPHILS RELATIVE PERCENT: 2.5 %
GFR AFRICAN AMERICAN: >60
GFR NON-AFRICAN AMERICAN: >60
GLOBULIN: 5.2 G/DL
GLUCOSE BLD-MCNC: 159 MG/DL (ref 70–99)
HCT VFR BLD CALC: 38.3 % (ref 40.5–52.5)
HEMOGLOBIN: 12 G/DL (ref 13.5–17.5)
LYMPHOCYTES ABSOLUTE: 1.2 K/UL (ref 1–5.1)
LYMPHOCYTES RELATIVE PERCENT: 24.2 %
MCH RBC QN AUTO: 26.8 PG (ref 26–34)
MCHC RBC AUTO-ENTMCNC: 31.3 G/DL (ref 31–36)
MCV RBC AUTO: 85.8 FL (ref 80–100)
MONOCYTES ABSOLUTE: 0.5 K/UL (ref 0–1.3)
MONOCYTES RELATIVE PERCENT: 10.4 %
NEUTROPHILS ABSOLUTE: 3 K/UL (ref 1.7–7.7)
NEUTROPHILS RELATIVE PERCENT: 61.9 %
PDW BLD-RTO: 16.1 % (ref 12.4–15.4)
PLATELET # BLD: 301 K/UL (ref 135–450)
PMV BLD AUTO: 8.3 FL (ref 5–10.5)
POTASSIUM REFLEX MAGNESIUM: 3.8 MMOL/L (ref 3.5–5.1)
RBC # BLD: 4.46 M/UL (ref 4.2–5.9)
SODIUM BLD-SCNC: 139 MMOL/L (ref 136–145)
TOTAL PROTEIN: 8.2 G/DL (ref 6.4–8.2)
WBC # BLD: 4.9 K/UL (ref 4–11)

## 2020-07-30 PROCEDURE — 6360000002 HC RX W HCPCS: Performed by: INTERNAL MEDICINE

## 2020-07-30 PROCEDURE — 6370000000 HC RX 637 (ALT 250 FOR IP): Performed by: HOSPITALIST

## 2020-07-30 PROCEDURE — 85379 FIBRIN DEGRADATION QUANT: CPT

## 2020-07-30 PROCEDURE — 6360000002 HC RX W HCPCS: Performed by: HOSPITALIST

## 2020-07-30 PROCEDURE — 2580000003 HC RX 258: Performed by: HOSPITALIST

## 2020-07-30 PROCEDURE — 80053 COMPREHEN METABOLIC PANEL: CPT

## 2020-07-30 PROCEDURE — 6370000000 HC RX 637 (ALT 250 FOR IP): Performed by: INTERNAL MEDICINE

## 2020-07-30 PROCEDURE — 85025 COMPLETE CBC W/AUTO DIFF WBC: CPT

## 2020-07-30 RX ADMIN — SODIUM CHLORIDE, PRESERVATIVE FREE 10 ML: 5 INJECTION INTRAVENOUS at 08:37

## 2020-07-30 RX ADMIN — SODIUM CHLORIDE, PRESERVATIVE FREE 10 ML: 5 INJECTION INTRAVENOUS at 00:21

## 2020-07-30 RX ADMIN — HALOPERIDOL LACTATE 2 MG: 5 INJECTION, SOLUTION INTRAMUSCULAR at 00:00

## 2020-07-30 RX ADMIN — ENOXAPARIN SODIUM 40 MG: 40 INJECTION SUBCUTANEOUS at 08:36

## 2020-07-30 RX ADMIN — LACTULOSE 20 G: 20 SOLUTION ORAL at 00:20

## 2020-07-30 RX ADMIN — LACTULOSE 20 G: 20 SOLUTION ORAL at 08:36

## 2020-07-30 RX ADMIN — QUETIAPINE FUMARATE 25 MG: 25 TABLET ORAL at 00:21

## 2020-07-30 RX ADMIN — CHLORPROMAZINE HYDROCHLORIDE 25 MG: 25 TABLET, SUGAR COATED ORAL at 00:21

## 2020-07-30 RX ADMIN — RIFAXIMIN 550 MG: 550 TABLET ORAL at 00:21

## 2020-07-30 RX ADMIN — PANTOPRAZOLE SODIUM 40 MG: 40 TABLET, DELAYED RELEASE ORAL at 06:08

## 2020-07-30 RX ADMIN — RIFAXIMIN 550 MG: 550 TABLET ORAL at 08:36

## 2020-07-30 RX ADMIN — QUETIAPINE FUMARATE 25 MG: 25 TABLET ORAL at 08:36

## 2020-07-30 NOTE — PROGRESS NOTES
Patient noted to be walking around room, very confused. Patient hard to re-direct and is a high fall risk. Patient finally was able to be put back in bed. Notified hospitalist about patient needing restraints and order given. Patient in [address] and 4 point restraints. VSS. Circulation checks WNL.

## 2020-07-30 NOTE — DISCHARGE INSTR - COC
Continuity of Care Form    Patient Name: Aguila Schumacher   :  1950  MRN:  [id number]    Admit date:  2020  Discharge date:      Code Status Order: Full Code   Advance Directives:   [facility] Documentation     Date/Time Healthcare Directive Type of Healthcare Directive Copy in [facility] [address] Agent's Name Healthcare Agent's Phone Number    20  No, patient does not have an advance directive for healthcare treatment -- -- -- -- --          Admitting Physician:  Jose F Bell MD  PCP: No primary care provider on file. Discharging Nurse: [facility] Unit/Room#: P6Y-9960/4726-00  Discharging Unit Phone Number: [phone]    Emergency Contact:   Extended Emergency Contact Information  Primary Emergency Contact: [address] Phone: [phone]  Work Phone: [phone]  Mobile Phone: [phone]  Relation: Child  Preferred language: English    Past Surgical History:  History reviewed. No pertinent surgical history. Immunization History: There is no immunization history on file for this patient. Active Problems:  Patient Active Problem List   Diagnosis Code    Change in mental status R41.82       Isolation/Infection:   Isolation          Droplet Plus        Unreconciled External Infections     Infection Onset Last Indicated Last Received Source    No mapped external infections found    .     Unmapped Infections (1)      COVID-19 20             Patient Infection Status     Infection Onset Added Last Indicated Last Indicated By Review Planned Expiration Resolved Resolved By    COVID-19 20 COVID-19  20      Resolved    COVID-19 Rule Out 20 COVID-19 (Ordered)   20 Rule-Out Test Resulted          Nurse Assessment:  Last Vital Signs: /82   Pulse 90   Temp 98 °F (36.7 °C) (Axillary)   Resp 16   Wt 165 lb 5.5 oz (75 kg)   SpO2 95%   BMI 22.42 kg/m²     Last documented pain score (0-10 scale): Pain Level: 0  Last Weight:   Wt Readings from Last 1 Encounters:   07/30/20 165 lb 5.5 oz (75 kg)     Mental Status:  oriented and alert    IV Access:  - None    Nursing Mobility/ADLs:  Walking   Dependent  Transfer  Dependent  Bathing  Dependent  Dressing  Dependent  Toileting  Dependent  Feeding  Independent  Med Admin  Dependent  Med Delivery   whole    Wound Care Documentation and Therapy:        Elimination:  Continence:   · Bowel: No  · Bladder: No  Urinary Catheter: None   Colostomy/Ileostomy/Ileal Conduit: No       Date of Last BM:     Intake/Output Summary (Last 24 hours) at 7/30/2020 0947  Last data filed at 7/30/2020 0901  Gross per 24 hour   Intake 960 ml   Output 350 ml   Net 610 ml     I/O last 3 completed shifts: In: 12 [P.O.:960]  Out: -     Safety Concerns: At Risk for Falls    Impairments/Disabilities:      None    Nutrition Therapy:  Current Nutrition Therapy:   - Oral Diet:  General    Routes of Feeding: Oral  Liquids: Thin Liquids  Daily Fluid Restriction: no  Last Modified Barium Swallow with Video (Video Swallowing Test): not done    Treatments at the Time of Hospital Discharge:   Respiratory Treatments:   Oxygen Therapy:  is not on home oxygen therapy. Ventilator:    - No ventilator support    Rehab Therapies:   Weight Bearing Status/Restrictions: No weight bearing restirctions  Other Medical Equipment (for information only, NOT a DME order):     Other Treatments:     Patient's personal belongings (please select all that are sent with patient):  None    RN SIGNATURE:  Electronically signed by Sara Parker RN on 7/30/20 at 11:18 AM EDT    CASE MANAGEMENT/SOCIAL WORK SECTION    Inpatient Status Date: 7/24/2020    Readmission Risk Assessment Score:  Readmission Risk              Risk of Unplanned Readmission:        18           Discharging to Facility/ Agency   · Name: [facility]  · Address: [address] [place] [address], [address] [address]  · Phone: [phone]  · [phone]    / signature: Electronically signed by CHANTELL Gomez on 7/30/2020 at 9:48 AM      PHYSICIAN SECTION    Prognosis: Guarded    Condition at Discharge: Stable    Rehab Potential (if transferring to Rehab): Fair    Recommended Labs or Other Treatments After Discharge: PT/OT/RN consult. Droplet isolation COVID positive    Physician Certification: I certify the above information and transfer of Bebeto Doyle  is necessary for the continuing treatment of the diagnosis listed and that he requires [facility] for greater 30 days.      Update Admission H&P: No change in H&P    PHYSICIAN SIGNATURE:  Electronically signed by Tracie Arroyo MD on 7/30/20 at 9:50 AM EDT

## 2020-07-30 NOTE — PROGRESS NOTES
Report given to [facility] nurse at [facility] crest, all questions answered and callback number provided. Paperwork sent with transportation. Son Naila Dumont called and updated, all questions answered.  Electronically signed by Meghan Mercer RN on 7/30/2020 at 1:35 PM

## 2020-07-30 NOTE — PLAN OF CARE
Problem: Falls - Risk of:  Goal: Will remain free from falls  Description: Will remain free from falls  7/30/2020 0134 by Vikki Mera RN  Outcome: Ongoing  7/29/2020 1208 by Inna Kapadia RN  Outcome: Ongoing  Note: Fall risk assessment completed. Fall precautions in place. Call light within reach. Pt educated on calling for assistance before getting up. Walkway free of clutter. Will continue to monitor. Goal: Absence of physical injury  Description: Absence of physical injury  7/30/2020 0134 by Vikki Mera RN  Outcome: Ongoing  7/29/2020 1208 by Inna Kapadia RN  Outcome: Ongoing     Problem: Skin Integrity:  Goal: Will show no infection signs and symptoms  Description: Will show no infection signs and symptoms  7/30/2020 0134 by Vikki Mera RN  Outcome: Ongoing  7/29/2020 1208 by Inna Kapadia RN  Outcome: Ongoing  Note: Nasir assessed. Skin assessed. Pt turned/repositioned q2h to prevent skin breakdown. No new skin issues to note. Goal: Absence of new skin breakdown  Description: Absence of new skin breakdown  7/30/2020 0134 by Vikki Mera RN  Outcome: Ongoing  7/29/2020 1208 by Inna Kapadia RN  Outcome: Ongoing     Problem: Confusion - Acute:  Goal: Absence of continued neurological deterioration signs and symptoms  Description: Absence of continued neurological deterioration signs and symptoms  7/30/2020 0134 by Vikki Mera RN  Outcome: Ongoing  7/29/2020 1208 by Inna Kapadia RN  Outcome: Ongoing  Note: Pt with dementia. Pt off baseline mentation. Pt reoriented as able. Fall precautions in place. Tele cam in place. Medications administered as directed.    Goal: Mental status will be restored to baseline  Description: Mental status will be restored to baseline  7/30/2020 0134 by Vikki Mera RN  Outcome: Ongoing  7/29/2020 1208 by Inna Kapadia RN  Outcome: Ongoing     Problem: Discharge Planning:  Goal: Ability to perform activities of daily living will improve  Description: Ability to perform activities of daily living will improve  7/30/2020 0134 by Neris Morales RN  Outcome: Ongoing  7/29/2020 1208 by Jimenez Fisher RN  Outcome: Ongoing  Note: Plan to dc to SNF. SW following for dc needs. Goal: Participates in care planning  Description: Participates in care planning  7/30/2020 0134 by Neris Morales RN  Outcome: Ongoing  7/29/2020 1208 by Jimenez Fisher RN  Outcome: Ongoing     Problem: Injury - Risk of, Physical Injury:  Goal: Will remain free from falls  Description: Will remain free from falls  7/30/2020 0134 by Neris Morales RN  Outcome: Ongoing  7/29/2020 1208 by Jimenez Fisher RN  Outcome: Ongoing  Note: Fall risk assessment completed. Fall precautions in place. Call light within reach. Pt educated on calling for assistance before getting up. Walkway free of clutter. Will continue to monitor.      Goal: Absence of physical injury  Description: Absence of physical injury  7/30/2020 0134 by Neris Morales RN  Outcome: Ongoing  7/29/2020 1208 by Jimenez Fisher RN  Outcome: Ongoing     Problem: Mood - Altered:  Goal: Mood stable  Description: Mood stable  7/30/2020 0134 by Neris Morales RN  Outcome: Ongoing  7/29/2020 1208 by Jimenez Fisher RN  Outcome: Ongoing  Goal: Absence of abusive behavior  Description: Absence of abusive behavior  7/30/2020 0134 by Neris Morales RN  Outcome: Ongoing  7/29/2020 1208 by Jimenez Fisher RN  Outcome: Ongoing  Goal: Verbalizations of feeling emotionally comfortable while being cared for will increase  Description: Verbalizations of feeling emotionally comfortable while being cared for will increase  7/30/2020 0134 by Neris Morales RN  Outcome: Ongoing  7/29/2020 1208 by Jimenez Fisher RN  Outcome: Ongoing     Problem: Psychomotor Activity - Altered:  Goal: Absence of psychomotor disturbance signs and symptoms  Description: Absence of psychomotor disturbance signs and symptoms  7/30/2020 0134 by Jose Angel Jordan RN  Outcome: Ongoing  7/29/2020 1208 by Dawson Murillo RN  Outcome: Ongoing     Problem: Sensory Perception - Impaired:  Goal: Demonstrations of improved sensory functioning will increase  Description: Demonstrations of improved sensory functioning will increase  7/30/2020 0134 by Jose Angel Jordan RN  Outcome: Ongoing  7/29/2020 1208 by Dawson Murillo RN  Outcome: Ongoing  Goal: Decrease in sensory misperception frequency  Description: Decrease in sensory misperception frequency  7/30/2020 0134 by Jose Angel Jordan RN  Outcome: Ongoing  7/29/2020 1208 by Dawson Murillo RN  Outcome: Ongoing  Goal: Able to refrain from responding to false sensory perceptions  Description: Able to refrain from responding to false sensory perceptions  7/30/2020 0134 by Jose Angel Jordan RN  Outcome: Ongoing  7/29/2020 1208 by Dawson Murillo RN  Outcome: Ongoing  Goal: Demonstrates accurate environmental perceptions  Description: Demonstrates accurate environmental perceptions  7/30/2020 0134 by Jose Angel Jordan RN  Outcome: Ongoing  7/29/2020 1208 by Dawson Murillo RN  Outcome: Ongoing  Goal: Able to distinguish between reality-based and nonreality-based thinking  Description: Able to distinguish between reality-based and nonreality-based thinking  7/30/2020 0134 by Jose Angel Jordan RN  Outcome: Ongoing  7/29/2020 1208 by Dawson Murillo RN  Outcome: Ongoing  Goal: Able to interrupt nonreality-based thinking  Description: Able to interrupt nonreality-based thinking  7/30/2020 0134 by Jose Angel Jordan RN  Outcome: Ongoing  7/29/2020 1208 by Dawson Murillo RN  Outcome: Ongoing     Problem: Sleep Pattern Disturbance:  Goal: Appears well-rested  Description: Appears well-rested  7/30/2020 0134 by Jose Angel Jordan RN  Outcome: Ongoing  7/29/2020 1208 by Dawson Murillo RN  Outcome: Ongoing     Problem: Restraint Use - Nonviolent/Non-Self-Destructive Behavior:  Goal: Absence of restraint indications  Description: Absence of restraint indications  7/30/2020 0134 by Eduardo Luong RN  Outcome: Ongoing  7/29/2020 1208 by Mendoza Oscar RN  Outcome: Ongoing  Goal: Absence of restraint-related injury  Description: Absence of restraint-related injury  7/30/2020 0134 by Eduardo Luong RN  Outcome: Ongoing  7/29/2020 1208 by Mendoza Oscar RN  Outcome: Ongoing     Problem: Airway Clearance - Ineffective  Goal: Achieve or maintain patent airway  7/30/2020 0134 by Eduardo Luong RN  Outcome: Ongoing  7/29/2020 1208 by Mendoza Oscar RN  Outcome: Ongoing     Problem: Gas Exchange - Impaired  Goal: Absence of hypoxia  7/30/2020 0134 by Eduardo Luong RN  Outcome: Ongoing  7/29/2020 1208 by Mendoza Oscar RN  Outcome: Ongoing  Goal: Promote optimal lung function  7/30/2020 0134 by Eduardo Luong RN  Outcome: Ongoing  7/29/2020 1208 by Mendoza Oscar RN  Outcome: Ongoing     Problem: Breathing Pattern - Ineffective  Goal: Ability to achieve and maintain a regular respiratory rate  7/30/2020 0134 by Eduardo Luong RN  Outcome: Ongoing  7/29/2020 1208 by Mendoza Oscar RN  Outcome: Ongoing     Problem:  Body Temperature -  Risk of, Imbalanced  Goal: Ability to maintain a body temperature within defined limits  7/30/2020 0134 by Eduardo Luong RN  Outcome: Ongoing  7/29/2020 1208 by Mendoza Oscar RN  Outcome: Ongoing  Goal: Will regain or maintain usual level of consciousness  7/30/2020 0134 by Eduardo Luong RN  Outcome: Ongoing  7/29/2020 1208 by Mendoza Oscar RN  Outcome: Ongoing  Goal: Complications related to the disease process, condition or treatment will be avoided or minimized  7/30/2020 0134 by Eduardo Luong RN  Outcome: Ongoing  7/29/2020 1208 by Mendoza Oscar RN  Outcome: Ongoing     Problem: Isolation Precautions - Risk of Spread of Infection  Goal: Prevent transmission of infection  7/30/2020 0134 by Addison Rangel RN  Outcome: Ongoing  7/29/2020 1208 by Clint See RN  Outcome: Ongoing     Problem: Nutrition Deficits  Goal: Optimize nutrtional status  7/30/2020 0134 by Addison Rangel RN  Outcome: Ongoing  7/29/2020 1208 by Clint See RN  Outcome: Ongoing     Problem: Risk for Fluid Volume Deficit  Goal: Maintain normal heart rhythm  7/30/2020 0134 by Addison Rangel RN  Outcome: Ongoing  7/29/2020 1208 by Clint See RN  Outcome: Ongoing  Goal: Maintain absence of muscle cramping  7/30/2020 0134 by Addison Rangel RN  Outcome: Ongoing  7/29/2020 1208 by Clint See RN  Outcome: Ongoing  Goal: Maintain normal serum potassium, sodium, calcium, phosphorus, and pH  7/30/2020 0134 by Addison Rangel RN  Outcome: Ongoing  7/29/2020 1208 by Clint See RN  Outcome: Ongoing     Problem: Loneliness or Risk for Loneliness  Goal: Demonstrate positive use of time alone when socialization is not possible  7/30/2020 0134 by Addison Rangel RN  Outcome: Ongoing  7/29/2020 1208 by Clint See RN  Outcome: Ongoing     Problem: Fatigue  Goal: Verbalize increase energy and improved vitality  7/30/2020 0134 by Addison Rangel RN  Outcome: Ongoing  7/29/2020 1208 by Clint See RN  Outcome: Ongoing     Problem: Patient Education: Go to Patient Education Activity  Goal: Patient/Family Education  7/30/2020 0134 by Addison Rangel RN  Outcome: Ongoing  7/29/2020 1208 by Clint See RN  Outcome: Ongoing

## 2020-07-30 NOTE — DISCHARGE SUMMARY
Hospital Medicine Discharge Summary    Patient ID: Ruth Rob      Patient's PCP: No primary care provider on file. Admit Date: 7/24/2020     Discharge Date: 7/30/2020      Admitting Physician: Thu Thomson MD     Discharge Physician: Darby Nolasco MD     Discharge Diagnoses: Active Hospital Problems    Diagnosis Date Noted    COVID-19 [U07.1] 07/30/2020    Change in mental status [R41.82] 07/24/2020       The patient was seen and examined on day of discharge and this discharge summary is in conjunction with any daily progress note from day of discharge. Hospital Course: The patient Francisco Cotton is a 79 y.o.male With medical history significant for alcohol abuse and cirrhosis of the liver and anemia and dementia and hypertension  Patient brought to the emergency room for generalized weakness and further deterioration in mental status in a patient with dementia. MRI brain was obtained showed chronic small ischemic disease. During hospitalization patient was in restraints due to his agitations. Per RN report patient is in the dementia unit of nursing home. Seen and examined on day of discharge. Strengths. He wants me to take off his restraints. Denies any dyspnea, short chest pain, fever, chills. No acute event reported overnight. Acute metabolic encephalopathy - unclear etiology. May be 2/2 pneumonia and/or underlying dementia, hypernatremia, or CNS process.  Ammonia level is 44, doubt hepatic enephalopathy  -continue to treat pneumonia as below, COVID-19 +7/26.  -CT Head without contrast showed no acute intracranial abnormality; MRI chronic small ischemic disease.  -continue lactulose and rifaximin        Acute bronchitis due to COVID-19  -stop empiric Azithromycin/ceftriaxone  -droplet plus precautions  -COVID-19 test positive  -check inflammatory markers     Hypernatremia - improved  -changed NS to D5W, stop today  -nephrology consulted, recs appreciated     Debility  -PT/OT eval     Severe protein calorie malnutrition  -supplements provided  -Dietitian consult     Alcoholic cirrhosis - appears compensated  -continue to monitor  -continue lactulose      Physical Exam Performed:     /82   Pulse 90   Temp 98 °F (36.7 °C) (Axillary)   Resp 16   Wt 165 lb 5.5 oz (75 kg)   SpO2 95%   BMI 22.42 kg/m²       General appearance:No apparent distress, in restrains   HEENT: Pupils equal, round, and reactive to light. Conjunctivae/corneas clear. Neck: Supple, with full range of motion. No jugular venous distention. Trachea midline. Respiratory:  Normal respiratory effort. Clear to auscultation, bilaterally without Rales/Wheezes/Rhonchi. Cardiovascular: Regular rate and rhythm with normal S1/S2 without murmurs, rubs or gallops. Abdomen: Soft, non-tender, non-distended with normal bowel sounds. Musculoskeletal: No clubbing, cyanosis or edema bilaterally. Full range of motion without deformity. Skin: Skin color, texture, turgor normal.  No rashes or lesions. Neurologic:  Neurovascularly intact without any focal sensory/motor deficits. grossly non-focal.  Psychiatric: Alert and oriented to self only  Capillary Refill: Brisk,< 3 seconds   Peripheral Pulses: +2 palpable, equal bilaterally             Labs: For convenience and continuity at follow-up the following most recent labs are provided:      CBC:    Lab Results   Component Value Date    WBC 4.9 07/30/2020    HGB 12.0 07/30/2020    HCT 38.3 07/30/2020     07/30/2020       Renal:    Lab Results   Component Value Date     07/30/2020    K 3.8 07/30/2020     07/30/2020    CO2 22 07/30/2020    BUN 8 07/30/2020    CREATININE 0.8 07/30/2020    CALCIUM 9.0 07/30/2020    PHOS 3.6 07/24/2020         Significant Diagnostic Studies    Radiology:   MRI BRAIN WO CONTRAST   Final Result   Generalized involutional changes and chronic small ischemic disease.   No   acute stroke midline shift or mass effect. XR CHEST PORTABLE   Final Result   New bilateral ground-glass and interstitial opacities. The appearance is   most consistent with atypical/viral pneumonia         CT Head WO Contrast   Final Result   No acute intracranial abnormality. Consults:     IP CONSULT TO HOSPITALIST  IP CONSULT TO NEPHROLOGY    Disposition:  snf     Condition at Discharge: Stable    Discharge Instructions/Follow-up:  PCP    Code Status:  Full Code     Activity: activity as tolerated    Diet: regular diet      Discharge Medications:     Discharge Medication List as of 7/30/2020 12:06 PM           Details   chlorproMAZINE (THORAZINE) 25 MG tablet Take 1 tablet by mouth 4 times dailyHistorical Med      pantoprazole (PROTONIX) 20 MG tablet Take 1 tablet by mouth dailyHistorical Med      lactulose (CHRONULAC) 10 GM/15ML solution Take 30 mLs by mouth 4 times dailyHistorical Med      bisacodyl (DULCOLAX) 10 MG suppository Place 10 mg rectally daily as needed for ConstipationHistorical Med      magnesium citrate solution Take 296 mLs by mouth daily as needed for ConstipationHistorical Med      magnesium hydroxide (MILK OF MAGNESIA CONCENTRATE) 2400 MG/10ML SUSP Take 30 mLs by mouth daily as needed, Oral, DAILY PRN, Historical Med      Sodium Phosphates (FLEET) 7-19 GM/118ML Place 1 enema rectally daily as neededHistorical Med      acetaminophen (TYLENOL) 325 MG tablet Take 650 mg by mouth every 6 hours as needed for PainHistorical Med             Time Spent on discharge is more than 30 minutes in the examination, evaluation, counseling and review of medications and discharge plan. Signed:    Everett Horton MD   7/30/2020      Thank you No primary care provider on file. for the opportunity to be involved in this patient's care. If you have any questions or concerns please feel free to contact me at [phone].

## 2020-07-30 NOTE — CARE COORDINATION
Spoke with Teto Mccormick at [facility]. They are able to accept when ready. Does NOT need to be restraint free.  Message to MD to inform    Electronically signed by CHANTELL Jean-Baptiste on 7/30/2020 at 9:49 AM

## 2020-07-30 NOTE — PROGRESS NOTES
Patient agitated and took restraints off. Attempting to get out of bed. Restraints put back on and patient attempted to try to take them off again. Prn haldol given.

## 2025-06-24 ENCOUNTER — APPOINTMENT (OUTPATIENT)
Dept: CT IMAGING | Age: 75
DRG: 637 | End: 2025-06-24
Payer: MEDICARE

## 2025-06-24 ENCOUNTER — APPOINTMENT (OUTPATIENT)
Dept: GENERAL RADIOLOGY | Age: 75
DRG: 637 | End: 2025-06-24
Payer: MEDICARE

## 2025-06-24 ENCOUNTER — HOSPITAL ENCOUNTER (INPATIENT)
Age: 75
LOS: 2 days | Discharge: NURSING, REHAB OR LONG-TERM CARE | DRG: 637 | End: 2025-06-26
Attending: EMERGENCY MEDICINE | Admitting: INTERNAL MEDICINE
Payer: MEDICARE

## 2025-06-24 DIAGNOSIS — E11.00 HYPEROSMOLAR HYPERGLYCEMIC STATE (HHS) (HCC): Primary | ICD-10-CM

## 2025-06-24 DIAGNOSIS — N17.9 AKI (ACUTE KIDNEY INJURY): ICD-10-CM

## 2025-06-24 LAB
ALBUMIN SERPL-MCNC: 3.9 G/DL (ref 3.4–5)
ALP SERPL-CCNC: 137 U/L (ref 40–129)
ALT SERPL-CCNC: 10 U/L (ref 10–40)
AMMONIA PLAS-SCNC: 45 UMOL/L (ref 16–60)
ANION GAP SERPL CALCULATED.3IONS-SCNC: 15 MMOL/L (ref 3–16)
ANION GAP SERPL CALCULATED.3IONS-SCNC: 17 MMOL/L (ref 3–16)
AST SERPL-CCNC: 18 U/L (ref 15–37)
BACTERIA URNS QL MICRO: NORMAL /HPF
BASE EXCESS BLDV CALC-SCNC: -0.6 MMOL/L
BASOPHILS # BLD: 0.1 K/UL (ref 0–0.2)
BASOPHILS NFR BLD: 0.6 %
BETA-HYDROXYBUTYRATE: 1.61 MMOL/L (ref 0–0.27)
BILIRUB DIRECT SERPL-MCNC: 0.3 MG/DL (ref 0–0.3)
BILIRUB INDIRECT SERPL-MCNC: 0.5 MG/DL (ref 0–1)
BILIRUB SERPL-MCNC: 0.8 MG/DL (ref 0–1)
BILIRUB UR QL STRIP.AUTO: NEGATIVE
BUN SERPL-MCNC: 43 MG/DL (ref 7–20)
BUN SERPL-MCNC: 43 MG/DL (ref 7–20)
CALCIUM SERPL-MCNC: 9.7 MG/DL (ref 8.3–10.6)
CALCIUM SERPL-MCNC: 9.9 MG/DL (ref 8.3–10.6)
CHLORIDE SERPL-SCNC: 106 MMOL/L (ref 99–110)
CHLORIDE SERPL-SCNC: 112 MMOL/L (ref 99–110)
CLARITY UR: CLEAR
CO2 BLDV-SCNC: 27 MMOL/L
CO2 SERPL-SCNC: 22 MMOL/L (ref 21–32)
CO2 SERPL-SCNC: 22 MMOL/L (ref 21–32)
COHGB MFR BLDV: 1.7 %
COLOR UR: YELLOW
CREAT SERPL-MCNC: 1.7 MG/DL (ref 0.8–1.3)
CREAT SERPL-MCNC: 1.9 MG/DL (ref 0.8–1.3)
DEPRECATED RDW RBC AUTO: 14.1 % (ref 12.4–15.4)
EOSINOPHIL # BLD: 0 K/UL (ref 0–0.6)
EOSINOPHIL NFR BLD: 0 %
EPI CELLS #/AREA URNS AUTO: 1 /HPF (ref 0–5)
EST. AVERAGE GLUCOSE BLD GHB EST-MCNC: 263.3 MG/DL
GFR SERPLBLD CREATININE-BSD FMLA CKD-EPI: 36 ML/MIN/{1.73_M2}
GFR SERPLBLD CREATININE-BSD FMLA CKD-EPI: 41 ML/MIN/{1.73_M2}
GLUCOSE BLD-MCNC: 256 MG/DL (ref 70–99)
GLUCOSE BLD-MCNC: 287 MG/DL (ref 70–99)
GLUCOSE BLD-MCNC: 369 MG/DL (ref 70–99)
GLUCOSE BLD-MCNC: 430 MG/DL (ref 70–99)
GLUCOSE BLD-MCNC: 532 MG/DL (ref 70–99)
GLUCOSE BLD-MCNC: 569 MG/DL (ref 70–99)
GLUCOSE BLD-MCNC: >600 MG/DL (ref 70–99)
GLUCOSE SERPL-MCNC: 509 MG/DL (ref 70–99)
GLUCOSE SERPL-MCNC: 753 MG/DL (ref 70–99)
GLUCOSE UR STRIP.AUTO-MCNC: >=1000 MG/DL
HBA1C MFR BLD: 10.8 %
HCO3 BLDV-SCNC: 25 MMOL/L (ref 23–29)
HCT VFR BLD AUTO: 45.6 % (ref 40.5–52.5)
HGB BLD-MCNC: 14.5 G/DL (ref 13.5–17.5)
HGB UR QL STRIP.AUTO: ABNORMAL
HYALINE CASTS #/AREA URNS AUTO: 2 /LPF (ref 0–8)
KETONES UR STRIP.AUTO-MCNC: ABNORMAL MG/DL
LACTATE BLDV-SCNC: 2 MMOL/L (ref 0.4–1.9)
LACTATE BLDV-SCNC: 2.6 MMOL/L (ref 0.4–1.9)
LEUKOCYTE ESTERASE UR QL STRIP.AUTO: NEGATIVE
LIPASE SERPL-CCNC: 20 U/L (ref 13–60)
LYMPHOCYTES # BLD: 0.8 K/UL (ref 1–5.1)
LYMPHOCYTES NFR BLD: 6.2 %
MAGNESIUM SERPL-MCNC: 3.12 MG/DL (ref 1.8–2.4)
MCH RBC QN AUTO: 27.8 PG (ref 26–34)
MCHC RBC AUTO-ENTMCNC: 31.7 G/DL (ref 31–36)
MCV RBC AUTO: 87.5 FL (ref 80–100)
METHGB MFR BLDV: 0.2 %
MONOCYTES # BLD: 0.9 K/UL (ref 0–1.3)
MONOCYTES NFR BLD: 6.7 %
NEUTROPHILS # BLD: 11.6 K/UL (ref 1.7–7.7)
NEUTROPHILS NFR BLD: 86.5 %
NITRITE UR QL STRIP.AUTO: NEGATIVE
NT-PROBNP SERPL-MCNC: 169 PG/ML (ref 0–449)
O2 THERAPY: NORMAL
PCO2 BLDV: 45.7 MMHG (ref 40–50)
PERFORMED ON: ABNORMAL
PH BLDV: 7.35 [PH] (ref 7.35–7.45)
PH UR STRIP.AUTO: 5.5 [PH] (ref 5–8)
PHOSPHATE SERPL-MCNC: 1.7 MG/DL (ref 2.5–4.9)
PLATELET # BLD AUTO: 376 K/UL (ref 135–450)
PMV BLD AUTO: 8.8 FL (ref 5–10.5)
PO2 BLDV: 56 MMHG
POTASSIUM SERPL-SCNC: 3.2 MMOL/L (ref 3.5–5.1)
POTASSIUM SERPL-SCNC: 4 MMOL/L (ref 3.5–5.1)
PROT SERPL-MCNC: 9.4 G/DL (ref 6.4–8.2)
PROT UR STRIP.AUTO-MCNC: 100 MG/DL
RBC # BLD AUTO: 5.21 M/UL (ref 4.2–5.9)
RBC CLUMPS #/AREA URNS AUTO: 1 /HPF (ref 0–4)
REASON FOR REJECTION: NORMAL
REJECTED TEST: NORMAL
SAO2 % BLDV: 88 %
SODIUM SERPL-SCNC: 145 MMOL/L (ref 136–145)
SODIUM SERPL-SCNC: 149 MMOL/L (ref 136–145)
SP GR UR STRIP.AUTO: 1.03 (ref 1–1.03)
UA COMPLETE W REFLEX CULTURE PNL UR: ABNORMAL
UA DIPSTICK W REFLEX MICRO PNL UR: YES
URN SPEC COLLECT METH UR: ABNORMAL
UROBILINOGEN UR STRIP-ACNC: 0.2 E.U./DL
WBC # BLD AUTO: 13.4 K/UL (ref 4–11)
WBC #/AREA URNS AUTO: 2 /HPF (ref 0–5)

## 2025-06-24 PROCEDURE — 84100 ASSAY OF PHOSPHORUS: CPT

## 2025-06-24 PROCEDURE — 80076 HEPATIC FUNCTION PANEL: CPT

## 2025-06-24 PROCEDURE — 70450 CT HEAD/BRAIN W/O DYE: CPT

## 2025-06-24 PROCEDURE — 99285 EMERGENCY DEPT VISIT HI MDM: CPT

## 2025-06-24 PROCEDURE — 82803 BLOOD GASES ANY COMBINATION: CPT

## 2025-06-24 PROCEDURE — 85025 COMPLETE CBC W/AUTO DIFF WBC: CPT

## 2025-06-24 PROCEDURE — 71045 X-RAY EXAM CHEST 1 VIEW: CPT

## 2025-06-24 PROCEDURE — 6360000002 HC RX W HCPCS: Performed by: INTERNAL MEDICINE

## 2025-06-24 PROCEDURE — 82140 ASSAY OF AMMONIA: CPT

## 2025-06-24 PROCEDURE — 6370000000 HC RX 637 (ALT 250 FOR IP): Performed by: PHYSICIAN ASSISTANT

## 2025-06-24 PROCEDURE — 83690 ASSAY OF LIPASE: CPT

## 2025-06-24 PROCEDURE — 83036 HEMOGLOBIN GLYCOSYLATED A1C: CPT

## 2025-06-24 PROCEDURE — 83880 ASSAY OF NATRIURETIC PEPTIDE: CPT

## 2025-06-24 PROCEDURE — 2580000003 HC RX 258: Performed by: PHYSICIAN ASSISTANT

## 2025-06-24 PROCEDURE — 96360 HYDRATION IV INFUSION INIT: CPT

## 2025-06-24 PROCEDURE — 93005 ELECTROCARDIOGRAM TRACING: CPT | Performed by: PHYSICIAN ASSISTANT

## 2025-06-24 PROCEDURE — 82010 KETONE BODYS QUAN: CPT

## 2025-06-24 PROCEDURE — 83735 ASSAY OF MAGNESIUM: CPT

## 2025-06-24 PROCEDURE — 83605 ASSAY OF LACTIC ACID: CPT

## 2025-06-24 PROCEDURE — 2580000003 HC RX 258: Performed by: INTERNAL MEDICINE

## 2025-06-24 PROCEDURE — 80048 BASIC METABOLIC PNL TOTAL CA: CPT

## 2025-06-24 PROCEDURE — 81001 URINALYSIS AUTO W/SCOPE: CPT

## 2025-06-24 PROCEDURE — 36415 COLL VENOUS BLD VENIPUNCTURE: CPT

## 2025-06-24 PROCEDURE — 2000000000 HC ICU R&B

## 2025-06-24 RX ORDER — SERTRALINE HYDROCHLORIDE 25 MG/1
25 TABLET, FILM COATED ORAL DAILY
COMMUNITY

## 2025-06-24 RX ORDER — MEMANTINE HYDROCHLORIDE 10 MG/1
10 TABLET ORAL 2 TIMES DAILY
COMMUNITY

## 2025-06-24 RX ORDER — GALANTAMINE HYDROBROMIDE 8 MG/1
8 CAPSULE, EXTENDED RELEASE ORAL
COMMUNITY

## 2025-06-24 RX ORDER — HYDROCHLOROTHIAZIDE 25 MG/1
25 TABLET ORAL DAILY
COMMUNITY

## 2025-06-24 RX ORDER — ACETAMINOPHEN 325 MG/1
650 TABLET ORAL EVERY 6 HOURS PRN
COMMUNITY

## 2025-06-24 RX ORDER — 0.9 % SODIUM CHLORIDE 0.9 %
1000 INTRAVENOUS SOLUTION INTRAVENOUS ONCE
Status: COMPLETED | OUTPATIENT
Start: 2025-06-24 | End: 2025-06-24

## 2025-06-24 RX ORDER — POTASSIUM CHLORIDE 7.45 MG/ML
10 INJECTION INTRAVENOUS PRN
Status: DISCONTINUED | OUTPATIENT
Start: 2025-06-24 | End: 2025-06-26 | Stop reason: HOSPADM

## 2025-06-24 RX ORDER — FAMOTIDINE 20 MG/1
10 TABLET, FILM COATED ORAL 2 TIMES DAILY
Status: DISCONTINUED | OUTPATIENT
Start: 2025-06-24 | End: 2025-06-26 | Stop reason: HOSPADM

## 2025-06-24 RX ORDER — LACTULOSE 10 G/15ML
30 SOLUTION ORAL 3 TIMES DAILY
COMMUNITY

## 2025-06-24 RX ORDER — GALANTAMINE 4 MG/1
4 TABLET, FILM COATED ORAL 2 TIMES DAILY WITH MEALS
Status: DISCONTINUED | OUTPATIENT
Start: 2025-06-25 | End: 2025-06-26 | Stop reason: HOSPADM

## 2025-06-24 RX ORDER — POLYETHYLENE GLYCOL 3350 17 G/17G
17 POWDER, FOR SOLUTION ORAL DAILY PRN
Status: DISCONTINUED | OUTPATIENT
Start: 2025-06-24 | End: 2025-06-26 | Stop reason: HOSPADM

## 2025-06-24 RX ORDER — DEXTROSE MONOHYDRATE AND SODIUM CHLORIDE 5; .45 G/100ML; G/100ML
INJECTION, SOLUTION INTRAVENOUS CONTINUOUS PRN
Status: DISCONTINUED | OUTPATIENT
Start: 2025-06-24 | End: 2025-06-24

## 2025-06-24 RX ORDER — MAGNESIUM SULFATE IN WATER 40 MG/ML
2000 INJECTION, SOLUTION INTRAVENOUS PRN
Status: DISCONTINUED | OUTPATIENT
Start: 2025-06-24 | End: 2025-06-24

## 2025-06-24 RX ORDER — MAGNESIUM SULFATE IN WATER 40 MG/ML
2000 INJECTION, SOLUTION INTRAVENOUS PRN
Status: DISCONTINUED | OUTPATIENT
Start: 2025-06-24 | End: 2025-06-26 | Stop reason: HOSPADM

## 2025-06-24 RX ORDER — ENOXAPARIN SODIUM 100 MG/ML
40 INJECTION SUBCUTANEOUS DAILY
Status: DISCONTINUED | OUTPATIENT
Start: 2025-06-25 | End: 2025-06-26 | Stop reason: HOSPADM

## 2025-06-24 RX ORDER — SERTRALINE HYDROCHLORIDE 25 MG/1
25 TABLET, FILM COATED ORAL DAILY
Status: DISCONTINUED | OUTPATIENT
Start: 2025-06-25 | End: 2025-06-26 | Stop reason: HOSPADM

## 2025-06-24 RX ORDER — GALANTAMINE HYDROBROMIDE 8 MG/1
8 CAPSULE, EXTENDED RELEASE ORAL
Status: DISCONTINUED | OUTPATIENT
Start: 2025-06-25 | End: 2025-06-24

## 2025-06-24 RX ORDER — POTASSIUM CHLORIDE 7.45 MG/ML
10 INJECTION INTRAVENOUS PRN
Status: DISCONTINUED | OUTPATIENT
Start: 2025-06-24 | End: 2025-06-24

## 2025-06-24 RX ORDER — SODIUM CHLORIDE 450 MG/100ML
INJECTION, SOLUTION INTRAVENOUS CONTINUOUS
Status: DISCONTINUED | OUTPATIENT
Start: 2025-06-24 | End: 2025-06-25

## 2025-06-24 RX ORDER — DEXTROSE MONOHYDRATE AND SODIUM CHLORIDE 5; .45 G/100ML; G/100ML
INJECTION, SOLUTION INTRAVENOUS CONTINUOUS PRN
Status: DISCONTINUED | OUTPATIENT
Start: 2025-06-24 | End: 2025-06-26 | Stop reason: HOSPADM

## 2025-06-24 RX ORDER — FAMOTIDINE 10 MG
10 TABLET ORAL 2 TIMES DAILY
COMMUNITY

## 2025-06-24 RX ORDER — SODIUM CHLORIDE 450 MG/100ML
INJECTION, SOLUTION INTRAVENOUS CONTINUOUS
Status: DISCONTINUED | OUTPATIENT
Start: 2025-06-24 | End: 2025-06-24

## 2025-06-24 RX ADMIN — SODIUM CHLORIDE 10.8 UNITS/HR: 9 INJECTION, SOLUTION INTRAVENOUS at 17:15

## 2025-06-24 RX ADMIN — SODIUM CHLORIDE: 0.45 INJECTION, SOLUTION INTRAVENOUS at 21:54

## 2025-06-24 RX ADMIN — SODIUM CHLORIDE: 0.45 INJECTION, SOLUTION INTRAVENOUS at 19:49

## 2025-06-24 RX ADMIN — SODIUM CHLORIDE: 0.45 INJECTION, SOLUTION INTRAVENOUS at 17:16

## 2025-06-24 RX ADMIN — SODIUM CHLORIDE 1000 ML: 0.9 INJECTION, SOLUTION INTRAVENOUS at 15:53

## 2025-06-24 RX ADMIN — POTASSIUM CHLORIDE 10 MEQ: 7.46 INJECTION, SOLUTION INTRAVENOUS at 23:24

## 2025-06-24 RX ADMIN — POTASSIUM CHLORIDE 10 MEQ: 7.46 INJECTION, SOLUTION INTRAVENOUS at 22:23

## 2025-06-24 RX ADMIN — POTASSIUM CHLORIDE 10 MEQ: 7.46 INJECTION, SOLUTION INTRAVENOUS at 21:00

## 2025-06-24 ASSESSMENT — PAIN SCALES - GENERAL
PAINLEVEL_OUTOF10: 0
PAINLEVEL_OUTOF10: 0

## 2025-06-24 ASSESSMENT — LIFESTYLE VARIABLES
HOW MANY STANDARD DRINKS CONTAINING ALCOHOL DO YOU HAVE ON A TYPICAL DAY: PATIENT DOES NOT DRINK
HOW OFTEN DO YOU HAVE A DRINK CONTAINING ALCOHOL: NEVER

## 2025-06-24 ASSESSMENT — PAIN - FUNCTIONAL ASSESSMENT: PAIN_FUNCTIONAL_ASSESSMENT: 0-10

## 2025-06-24 NOTE — ED NOTES
Pt informed that a urine sample is needed. Pt attempted to use bedside urinal and was unsuccessful. Pt refused a straight cath.

## 2025-06-24 NOTE — PROGRESS NOTES
Medication Reconciliation    List of medications patient is currently taking is complete.     Source of information: 1. Updated [facility]                                       2. EPIC records      Allergies  Patient has no known allergies.     Notes regarding home medications:   1. Patient received all of his home medications prior to arrival to the emergency department.    Tiffany Plasencia, Pharmacy Intern  6/24/2025 5:00 PM

## 2025-06-24 NOTE — ED TRIAGE NOTES
Pt arrived via [facility] from [facility]. Per EMS, they were called for pt \"responding slow\" and LKW was yesterday.  in EMS, no hx of diabetes. Alert only to self in triage and BS reading \"HIGH\"

## 2025-06-24 NOTE — ED PROVIDER NOTES
[facility] EMERGENCY DEPARTMENT  EMERGENCY DEPARTMENT ENCOUNTER      Pt Name: Francisco Cotton  MRN: [id number]  Birthdate 1950  Date of evaluation: 6/24/2025  Provider: XENIA Goyal  PCP: No primary care provider on file.  Note Started: 3:27 PM EDT     This patient was also seen and evaluated by the attending physician Nate Dumont MD.      CHIEF COMPLAINT       Chief Complaint   Patient presents with    Fatigue     Pt arrived via [facility] from [facility]. Per EMS, they were called for pt \"responding slow\" and LKW was yesterday.  in EMS, no hx of diabetes. Alert only to self in triage and BS reading \"HIGH\"        HISTORY OF PRESENT ILLNESS   (Location, Timing/Onset, Context/Setting, Quality, Duration, Modifying Factors, Severity, Associated Signs and Symptoms)  Note limiting factors.     Francisco Cotton is a 75 y.o. male who presents via EMS from nursing home with report of generalized weakness.  Chart shows a history of alcoholic cirrhosis, dementia, schizoaffective disorder, cognitive communication deficit.  Patient's baseline mental status is unknown at this time.  On arrival to the hospital by EMS he was placed on supplemental oxygen but does not use supplemental oxygen at baseline.  EMS also reports that the patient was hyperglycemic and they started IV fluids.  Patient says he does not feel sick.  Denies any pain.  He can tell me his name.  Patient is alert and responsive to stimuli.  When asked where he is right now he says \"[place].\"  When asked the month of the year he again replies \"[place].\"  Initial attempts by nursing staff to contact the staff at the patient's care facility unsuccessful.  No other information available at this time.    Nursing Notes were all reviewed and agreed with or any disagreements were addressed in the HPI.    REVIEW OF SYSTEMS    (2-9 systems for level 4, 10 or more for level 5)     Positives and pertinent 
which required my urgent intervention.      CLINICAL IMPRESSION  1. Hyperosmolar hyperglycemic state (HHS) (HCC)    2. MICHEL (acute kidney injury)      DISPOSITION Admitted 06/24/2025 05:00:38 PM           I, Nate Dumont MD, am the primary clinician of record.   I personally saw the patient and independently provided 35 minutes of non-concurrent critical care out of the total shared critical care time provided.    This chart was generated in part by using Dragon Dictation system and may contain errors related to that system including errors in grammar, punctuation, and spelling, as well as words and phrases that may be inappropriate. If there are any questions or concerns please feel free to contact the dictating provider for clarification.     Nate Dumont MD   [employer]        Nate Dumont MD  06/24/25 2351

## 2025-06-24 NOTE — H&P
Hospital Medicine History & Physical      PCP: No primary care provider on file.    Date of Admission: 6/24/2025    Date of Service: Pt seen/examined on 06/24/2025 and Admitted to Inpatient with expected LOS greater than two midnights due to medical therapy.   Chief Complaint: Weakness      History Of Present Illness:      75 y.o. male who presented to [facility] with generalized weakness with history of alcohol use, cirrhotic liver, on presentation found to be hyperglycemic encephalopathic, no fever chills no vomiting started on IV insulin patient being admitted for further management and treatment.  No chest pain or shortness of breath at this time no headache or blurry vision    Past Medical History:          Diagnosis Date    Alcohol dependence with alcohol-induced persisting dementia (HCC)     Alcoholic cirrhosis of liver with ascites (HCC)     Alcoholic hepatitis without ascites (HCC)     Anemia     Anxiety     Chronic hepatitis in viral disease (HCC)     Cognitive communication deficit     Coordination abnormal     Dementia (HCC)     GERD (gastroesophageal reflux disease)     Hypertension     Malnutrition        Past Surgical History:      No past surgical history on file.    Medications Prior to Admission:      Prior to Admission medications    Medication Sig Start Date End Date Taking? Authorizing Provider   famotidine (PEPCID) 10 MG tablet Take 1 tablet by mouth 2 times daily   Yes Jose Roberto Avila MD   hydroCHLOROthiazide (HYDRODIURIL) 25 MG tablet Take 1 tablet by mouth daily Hold for SBP <110   Yes Jose Roberto Avila MD   lactulose (CHRONULAC) 10 GM/15ML solution Take 45 mLs by mouth 3 times daily   Yes Jose Roberto Avila MD   memantine (NAMENDA) 10 MG tablet Take 1 tablet by mouth 2 times daily   Yes Jose Roberto Avila MD   galantamine (RAZADYNE ER) 8 MG extended release capsule Take 1 capsule by mouth daily (with breakfast)   Yes Jose Roberto Avila MD   sertraline (ZOLOFT) 25 MG

## 2025-06-25 LAB
ANION GAP SERPL CALCULATED.3IONS-SCNC: 10 MMOL/L (ref 3–16)
ANION GAP SERPL CALCULATED.3IONS-SCNC: 10 MMOL/L (ref 3–16)
ANION GAP SERPL CALCULATED.3IONS-SCNC: 11 MMOL/L (ref 3–16)
ANION GAP SERPL CALCULATED.3IONS-SCNC: 9 MMOL/L (ref 3–16)
BASOPHILS # BLD: 0.1 K/UL (ref 0–0.2)
BASOPHILS NFR BLD: 0.6 %
BUN SERPL-MCNC: 32 MG/DL (ref 7–20)
BUN SERPL-MCNC: 34 MG/DL (ref 7–20)
BUN SERPL-MCNC: 37 MG/DL (ref 7–20)
BUN SERPL-MCNC: 41 MG/DL (ref 7–20)
CALCIUM SERPL-MCNC: 8.7 MG/DL (ref 8.3–10.6)
CALCIUM SERPL-MCNC: 8.9 MG/DL (ref 8.3–10.6)
CALCIUM SERPL-MCNC: 9 MG/DL (ref 8.3–10.6)
CALCIUM SERPL-MCNC: 9.5 MG/DL (ref 8.3–10.6)
CHLORIDE SERPL-SCNC: 114 MMOL/L (ref 99–110)
CHLORIDE SERPL-SCNC: 114 MMOL/L (ref 99–110)
CHLORIDE SERPL-SCNC: 116 MMOL/L (ref 99–110)
CHLORIDE SERPL-SCNC: 116 MMOL/L (ref 99–110)
CO2 SERPL-SCNC: 20 MMOL/L (ref 21–32)
CO2 SERPL-SCNC: 23 MMOL/L (ref 21–32)
CO2 SERPL-SCNC: 23 MMOL/L (ref 21–32)
CO2 SERPL-SCNC: 25 MMOL/L (ref 21–32)
CREAT SERPL-MCNC: 1.2 MG/DL (ref 0.8–1.3)
CREAT SERPL-MCNC: 1.2 MG/DL (ref 0.8–1.3)
CREAT SERPL-MCNC: 1.3 MG/DL (ref 0.8–1.3)
CREAT SERPL-MCNC: 1.4 MG/DL (ref 0.8–1.3)
DEPRECATED RDW RBC AUTO: 13.5 % (ref 12.4–15.4)
EKG ATRIAL RATE: 93 BPM
EKG DIAGNOSIS: NORMAL
EKG P AXIS: 69 DEGREES
EKG P-R INTERVAL: 184 MS
EKG Q-T INTERVAL: 396 MS
EKG QRS DURATION: 128 MS
EKG QTC CALCULATION (BAZETT): 492 MS
EKG R AXIS: -85 DEGREES
EKG T AXIS: 43 DEGREES
EKG VENTRICULAR RATE: 93 BPM
EOSINOPHIL # BLD: 0 K/UL (ref 0–0.6)
EOSINOPHIL NFR BLD: 0.3 %
GFR SERPLBLD CREATININE-BSD FMLA CKD-EPI: 52 ML/MIN/{1.73_M2}
GFR SERPLBLD CREATININE-BSD FMLA CKD-EPI: 57 ML/MIN/{1.73_M2}
GFR SERPLBLD CREATININE-BSD FMLA CKD-EPI: 63 ML/MIN/{1.73_M2}
GFR SERPLBLD CREATININE-BSD FMLA CKD-EPI: 63 ML/MIN/{1.73_M2}
GLUCOSE BLD-MCNC: 187 MG/DL (ref 70–99)
GLUCOSE BLD-MCNC: 190 MG/DL (ref 70–99)
GLUCOSE BLD-MCNC: 210 MG/DL (ref 70–99)
GLUCOSE BLD-MCNC: 221 MG/DL (ref 70–99)
GLUCOSE BLD-MCNC: 231 MG/DL (ref 70–99)
GLUCOSE BLD-MCNC: 237 MG/DL (ref 70–99)
GLUCOSE BLD-MCNC: 238 MG/DL (ref 70–99)
GLUCOSE BLD-MCNC: 244 MG/DL (ref 70–99)
GLUCOSE BLD-MCNC: 246 MG/DL (ref 70–99)
GLUCOSE BLD-MCNC: 249 MG/DL (ref 70–99)
GLUCOSE BLD-MCNC: 275 MG/DL (ref 70–99)
GLUCOSE BLD-MCNC: 281 MG/DL (ref 70–99)
GLUCOSE BLD-MCNC: 313 MG/DL (ref 70–99)
GLUCOSE BLD-MCNC: 337 MG/DL (ref 70–99)
GLUCOSE SERPL-MCNC: 207 MG/DL (ref 70–99)
GLUCOSE SERPL-MCNC: 225 MG/DL (ref 70–99)
GLUCOSE SERPL-MCNC: 258 MG/DL (ref 70–99)
GLUCOSE SERPL-MCNC: 317 MG/DL (ref 70–99)
HCT VFR BLD AUTO: 40.8 % (ref 40.5–52.5)
HGB BLD-MCNC: 13.1 G/DL (ref 13.5–17.5)
LYMPHOCYTES # BLD: 1.6 K/UL (ref 1–5.1)
LYMPHOCYTES NFR BLD: 12 %
MAGNESIUM SERPL-MCNC: 2.57 MG/DL (ref 1.8–2.4)
MAGNESIUM SERPL-MCNC: 2.64 MG/DL (ref 1.8–2.4)
MAGNESIUM SERPL-MCNC: 2.97 MG/DL (ref 1.8–2.4)
MAGNESIUM SERPL-MCNC: 3.23 MG/DL (ref 1.8–2.4)
MCH RBC QN AUTO: 27.7 PG (ref 26–34)
MCHC RBC AUTO-ENTMCNC: 32.1 G/DL (ref 31–36)
MCV RBC AUTO: 86.4 FL (ref 80–100)
MONOCYTES # BLD: 1.2 K/UL (ref 0–1.3)
MONOCYTES NFR BLD: 8.9 %
NEUTROPHILS # BLD: 10.2 K/UL (ref 1.7–7.7)
NEUTROPHILS NFR BLD: 78.2 %
PERFORMED ON: ABNORMAL
PHOSPHATE SERPL-MCNC: 1.2 MG/DL (ref 2.5–4.9)
PHOSPHATE SERPL-MCNC: 2.1 MG/DL (ref 2.5–4.9)
PHOSPHATE SERPL-MCNC: 2.3 MG/DL (ref 2.5–4.9)
PHOSPHATE SERPL-MCNC: 2.5 MG/DL (ref 2.5–4.9)
PLATELET # BLD AUTO: 323 K/UL (ref 135–450)
PMV BLD AUTO: 8.4 FL (ref 5–10.5)
POTASSIUM SERPL-SCNC: 3.5 MMOL/L (ref 3.5–5.1)
POTASSIUM SERPL-SCNC: 3.9 MMOL/L (ref 3.5–5.1)
POTASSIUM SERPL-SCNC: 4.1 MMOL/L (ref 3.5–5.1)
POTASSIUM SERPL-SCNC: 4.2 MMOL/L (ref 3.5–5.1)
RBC # BLD AUTO: 4.72 M/UL (ref 4.2–5.9)
SODIUM SERPL-SCNC: 144 MMOL/L (ref 136–145)
SODIUM SERPL-SCNC: 148 MMOL/L (ref 136–145)
SODIUM SERPL-SCNC: 149 MMOL/L (ref 136–145)
SODIUM SERPL-SCNC: 150 MMOL/L (ref 136–145)
WBC # BLD AUTO: 13.1 K/UL (ref 4–11)

## 2025-06-25 PROCEDURE — 83735 ASSAY OF MAGNESIUM: CPT

## 2025-06-25 PROCEDURE — 6370000000 HC RX 637 (ALT 250 FOR IP): Performed by: INTERNAL MEDICINE

## 2025-06-25 PROCEDURE — 2500000003 HC RX 250 WO HCPCS: Performed by: INTERNAL MEDICINE

## 2025-06-25 PROCEDURE — 51798 US URINE CAPACITY MEASURE: CPT

## 2025-06-25 PROCEDURE — 85025 COMPLETE CBC W/AUTO DIFF WBC: CPT

## 2025-06-25 PROCEDURE — 36415 COLL VENOUS BLD VENIPUNCTURE: CPT

## 2025-06-25 PROCEDURE — 94760 N-INVAS EAR/PLS OXIMETRY 1: CPT

## 2025-06-25 PROCEDURE — 80048 BASIC METABOLIC PNL TOTAL CA: CPT

## 2025-06-25 PROCEDURE — 6360000002 HC RX W HCPCS: Performed by: INTERNAL MEDICINE

## 2025-06-25 PROCEDURE — 1200000000 HC SEMI PRIVATE

## 2025-06-25 PROCEDURE — 6370000000 HC RX 637 (ALT 250 FOR IP): Performed by: REGISTERED NURSE

## 2025-06-25 PROCEDURE — 6370000000 HC RX 637 (ALT 250 FOR IP): Performed by: STUDENT IN AN ORGANIZED HEALTH CARE EDUCATION/TRAINING PROGRAM

## 2025-06-25 PROCEDURE — 93010 ELECTROCARDIOGRAM REPORT: CPT | Performed by: STUDENT IN AN ORGANIZED HEALTH CARE EDUCATION/TRAINING PROGRAM

## 2025-06-25 PROCEDURE — 84100 ASSAY OF PHOSPHORUS: CPT

## 2025-06-25 PROCEDURE — 2580000003 HC RX 258: Performed by: INTERNAL MEDICINE

## 2025-06-25 RX ORDER — INSULIN GLARGINE 100 [IU]/ML
0.25 INJECTION, SOLUTION SUBCUTANEOUS DAILY
Status: DISCONTINUED | OUTPATIENT
Start: 2025-06-25 | End: 2025-06-26

## 2025-06-25 RX ORDER — INSULIN LISPRO 100 [IU]/ML
0.08 INJECTION, SOLUTION INTRAVENOUS; SUBCUTANEOUS
Status: DISCONTINUED | OUTPATIENT
Start: 2025-06-25 | End: 2025-06-26

## 2025-06-25 RX ORDER — INSULIN LISPRO 100 [IU]/ML
0-4 INJECTION, SOLUTION INTRAVENOUS; SUBCUTANEOUS
Status: DISCONTINUED | OUTPATIENT
Start: 2025-06-25 | End: 2025-06-26 | Stop reason: HOSPADM

## 2025-06-25 RX ORDER — GLUCAGON 1 MG/ML
1 KIT INJECTION PRN
Status: DISCONTINUED | OUTPATIENT
Start: 2025-06-25 | End: 2025-06-26 | Stop reason: HOSPADM

## 2025-06-25 RX ORDER — HYDROCHLOROTHIAZIDE 25 MG/1
25 TABLET ORAL DAILY
Status: DISCONTINUED | OUTPATIENT
Start: 2025-06-25 | End: 2025-06-26 | Stop reason: HOSPADM

## 2025-06-25 RX ORDER — DEXTROSE MONOHYDRATE 100 MG/ML
INJECTION, SOLUTION INTRAVENOUS CONTINUOUS PRN
Status: DISCONTINUED | OUTPATIENT
Start: 2025-06-25 | End: 2025-06-26 | Stop reason: HOSPADM

## 2025-06-25 RX ORDER — LACTULOSE 10 G/15ML
30 SOLUTION ORAL 3 TIMES DAILY
Status: DISCONTINUED | OUTPATIENT
Start: 2025-06-25 | End: 2025-06-26 | Stop reason: HOSPADM

## 2025-06-25 RX ADMIN — POTASSIUM CHLORIDE 10 MEQ: 7.46 INJECTION, SOLUTION INTRAVENOUS at 00:26

## 2025-06-25 RX ADMIN — DEXTROSE AND SODIUM CHLORIDE: 5; .45 INJECTION, SOLUTION INTRAVENOUS at 00:25

## 2025-06-25 RX ADMIN — INSULIN LISPRO 3 UNITS: 100 INJECTION, SOLUTION INTRAVENOUS; SUBCUTANEOUS at 11:01

## 2025-06-25 RX ADMIN — SODIUM PHOSPHATE, MONOBASIC, MONOHYDRATE AND SODIUM PHOSPHATE, DIBASIC, ANHYDROUS 15 MMOL: 142; 276 INJECTION, SOLUTION INTRAVENOUS at 10:11

## 2025-06-25 RX ADMIN — ENOXAPARIN SODIUM 40 MG: 100 INJECTION SUBCUTANEOUS at 09:47

## 2025-06-25 RX ADMIN — SODIUM PHOSPHATE, MONOBASIC, MONOHYDRATE AND SODIUM PHOSPHATE, DIBASIC, ANHYDROUS 15 MMOL: 142; 276 INJECTION, SOLUTION INTRAVENOUS at 00:29

## 2025-06-25 RX ADMIN — FAMOTIDINE 10 MG: 20 TABLET, FILM COATED ORAL at 22:08

## 2025-06-25 RX ADMIN — SERTRALINE 25 MG: 25 TABLET, FILM COATED ORAL at 09:49

## 2025-06-25 RX ADMIN — GALANTAMINE 4 MG: 4 TABLET, FILM COATED ORAL at 17:24

## 2025-06-25 RX ADMIN — POTASSIUM CHLORIDE 10 MEQ: 7.46 INJECTION, SOLUTION INTRAVENOUS at 09:56

## 2025-06-25 RX ADMIN — POTASSIUM CHLORIDE 10 MEQ: 7.46 INJECTION, SOLUTION INTRAVENOUS at 06:55

## 2025-06-25 RX ADMIN — FAMOTIDINE 10 MG: 20 TABLET, FILM COATED ORAL at 09:49

## 2025-06-25 RX ADMIN — POTASSIUM CHLORIDE 10 MEQ: 7.46 INJECTION, SOLUTION INTRAVENOUS at 03:32

## 2025-06-25 RX ADMIN — SODIUM CHLORIDE 4.8 UNITS/HR: 9 INJECTION, SOLUTION INTRAVENOUS at 00:04

## 2025-06-25 RX ADMIN — GALANTAMINE 4 MG: 4 TABLET, FILM COATED ORAL at 09:58

## 2025-06-25 RX ADMIN — INSULIN LISPRO 2 UNITS: 100 INJECTION, SOLUTION INTRAVENOUS; SUBCUTANEOUS at 17:23

## 2025-06-25 RX ADMIN — POTASSIUM CHLORIDE 10 MEQ: 7.46 INJECTION, SOLUTION INTRAVENOUS at 02:31

## 2025-06-25 RX ADMIN — POTASSIUM CHLORIDE 10 MEQ: 7.46 INJECTION, SOLUTION INTRAVENOUS at 01:27

## 2025-06-25 RX ADMIN — INSULIN GLARGINE 17 UNITS: 100 INJECTION, SOLUTION SUBCUTANEOUS at 09:52

## 2025-06-25 RX ADMIN — DEXTROSE AND SODIUM CHLORIDE: 5; .45 INJECTION, SOLUTION INTRAVENOUS at 08:41

## 2025-06-25 RX ADMIN — INSULIN LISPRO 1 UNITS: 100 INJECTION, SOLUTION INTRAVENOUS; SUBCUTANEOUS at 22:08

## 2025-06-25 RX ADMIN — POTASSIUM CHLORIDE 10 MEQ: 7.46 INJECTION, SOLUTION INTRAVENOUS at 08:26

## 2025-06-25 ASSESSMENT — PAIN SCALES - GENERAL: PAINLEVEL_OUTOF10: 0

## 2025-06-25 NOTE — PLAN OF CARE
Problem: Chronic Conditions and Co-morbidities  Goal: Patient's chronic conditions and co-morbidity symptoms are monitored and maintained or improved  Outcome: Progressing  Flowsheets (Taken 6/24/2025 2000)  Care Plan - Patient's Chronic Conditions and Co-Morbidity Symptoms are Monitored and Maintained or Improved:   Monitor and assess patient's chronic conditions and comorbid symptoms for stability, deterioration, or improvement   Collaborate with multidisciplinary team to address chronic and comorbid conditions and prevent exacerbation or deterioration   Update acute care plan with appropriate goals if chronic or comorbid symptoms are exacerbated and prevent overall improvement and discharge     Problem: Discharge Planning  Goal: Discharge to home or other facility with appropriate resources  Outcome: Progressing  Flowsheets (Taken 6/24/2025 2000)  Discharge to home or other facility with appropriate resources:   Identify barriers to discharge with patient and caregiver   Arrange for needed discharge resources and transportation as appropriate   Identify discharge learning needs (meds, wound care, etc)     Problem: Pain  Goal: Verbalizes/displays adequate comfort level or baseline comfort level  Outcome: Progressing  Flowsheets (Taken 6/24/2025 2000)  Verbalizes/displays adequate comfort level or baseline comfort level:   Encourage patient to monitor pain and request assistance   Assess pain using appropriate pain scale   Administer analgesics based on type and severity of pain and evaluate response     Problem: Skin/Tissue Integrity  Goal: Skin integrity remains intact  Description: 1.  Monitor for areas of redness and/or skin breakdown  2.  Assess vascular access sites hourly  3.  Every 4-6 hours minimum:  Change oxygen saturation probe site  4.  Every 4-6 hours:  If on nasal continuous positive airway pressure, respiratory therapy assess nares and determine need for appliance change or resting

## 2025-06-25 NOTE — PROGRESS NOTES
NAME:  Francisco Cotton  YOB: 1950  MEDICAL RECORD NUMBER:  [id number]    Shift Summary: pt transitioned off of insulin gtt. No other acute changes. Have to encourage pt to void.    Family updated: No    Rhythm: Normal Sinus Rhythm     Most recent vitals:   Visit Vitals  /68   Pulse 53   Temp 98.7 °F (37.1 °C) (Oral)   Resp 15   Ht 1.829 m (6')   Wt 67.8 kg (149 lb 7.6 oz)   SpO2 95%   BMI 20.27 kg/m²           No data found.    No data found.      Respiratory support needed (if any):  - RA    Admission weight Weight - Scale: 66.9 kg (147 lb 7.8 oz) (06/24/25 1453)    Today's weight    Wt Readings from Last 1 Encounters:   06/25/25 67.8 kg (149 lb 7.6 oz)        Paez need assessed each shift: N/A - no paez present  UOP >30ml/hr: YES  Last documented BM (in last 48 hrs):  No data found.             Restraints (in use currently or dc'd in last 12 hrs): No    Order current and documentation up to date? No    Lines/Drains reviewed @ bedside.  Peripheral IV 06/25/25 Left Forearm (Active)   Number of days: 0         Drip rates at handoff:    dextrose      dextrose 5 % and 0.45 % NaCl Stopped (06/25/25 1155)       Lab Data:   CBC:   Recent Labs     06/24/25  1509 06/25/25  0432   WBC 13.4* 13.1*   HGB 14.5 13.1*   HCT 45.6 40.8   MCV 87.5 86.4    323     BMP:    Recent Labs     06/25/25  0729 06/25/25  1146   * 144   K 3.5 4.2   CO2 23 20*   BUN 34* 32*   CREATININE 1.2 1.2     ABG: No results for input(s): \"PHART\", \"[id number]\", \"PO2ART\" in the last 72 hours.    Any consults during the shift? No    Any signed and held orders to be released?  No        4 Eyes Skin Assessment       The patient is being assessed for  Shift Handoff    I agree that at least one RN has performed a thorough Head to Toe Skin Assessment on the patient. ALL assessment sites listed below have been assessed.      Areas assessed by both nurses: Head, Face, Ears, Shoulders, Back, Chest, Arms, Elbows, Hands, Sacrum.

## 2025-06-25 NOTE — PROGRESS NOTES
NAME:  Francisco Cotton  YOB: 1950  MEDICAL RECORD NUMBER:  [id number]    Shift Summary: Gap closed, not yet transitioned. Pt oriented to self and place only.     Family updated: No    Rhythm: Normal Sinus Rhythm     Most recent vitals:   Visit Vitals  /80   Pulse 81   Temp 98.4 °F (36.9 °C) (Axillary)   Resp 20   Ht 1.829 m (6')   Wt 66.9 kg (147 lb 7.8 oz)   SpO2 94%   BMI 20.00 kg/m²           No data found.    No data found.      Respiratory support needed (if any):  - RA    Admission weight Weight - Scale: 66.9 kg (147 lb 7.8 oz) (06/24/25 1453)    Today's weight    Wt Readings from Last 1 Encounters:   06/24/25 66.9 kg (147 lb 7.8 oz)        Paez need assessed each shift: N/A - no paez present  UOP >30ml/hr: NO - see notes  Last documented BM (in last 48 hrs):  No data found.             Restraints (in use currently or dc'd in last 12 hrs): No    Order current and documentation up to date? Yes    Lines/Drains reviewed @ bedside.  Peripheral IV 06/24/25 Distal;Left Wrist (Active)   Number of days: 0       Peripheral IV 06/24/25 Distal;Right Wrist (Active)   Number of days: 0         Drip rates at handoff:    sodium chloride 250 mL/hr at 06/24/25 2154    dextrose 5 % and 0.45 % NaCl      insulin 6.8 Units/hr (06/24/25 2226)       Lab Data:   CBC:   Recent Labs     06/24/25  1509   WBC 13.4*   HGB 14.5   HCT 45.6   MCV 87.5        BMP:    Recent Labs     06/24/25  1509 06/24/25  1952    149*   K 4.0 3.2*   CO2 22 22   BUN 43* 43*   CREATININE 1.9* 1.7*     ABG: No results for input(s): \"PHART\", \"[id number]\", \"PO2ART\" in the last 72 hours.    Any consults during the shift? No    Any signed and held orders to be released?  No        4 Eyes Skin Assessment       The patient is being assessed for  Shift Handoff    I agree that at least one RN has performed a thorough Head to Toe Skin Assessment on the patient. ALL assessment sites listed below have been assessed.      Areas assessed

## 2025-06-25 NOTE — PROGRESS NOTES
V2.0  [facility] Hospitalist Progress Note      Name:  Francisco Cotton /Age/Sex: 1950  (75 y.o. male)   MRN & CSN:  [id number] & [id number] Encounter Date/Time: 2025 9:11 AM EDT    Location:  [id number] PCP: No primary care provider on file.       Hospital Day: 2    Assessment and Plan:   Francisco Cotton is a 75 y.o. male p/w AMS      Plan:  HHS  Type 2 diabetes mellitus  -A1c 10.8  - Transitioning to weight-based basal bolus insulin with low-dose sliding scale.  Monitor toxicity of insulin with glucose checks for hypoglycemia  - Carb controlled diet  - Appears to be new diagnosis for patient.  Diabetes education  Acute metabolic encephalopathy - improving  - Due to HHS  -pt A&O  MICHEL-improved  - Prerenal.  Improved with fluid resuscitation  Liver cirrhosis  -Liver labs, not particularly abnormal  -ammonia level normal, Resume lactulose as tolerated  Hypertension  -Monitor BP  -Resume home medication as tolerated  Dementia  - Galantamine      Ppx: lovenox  Dispo: Home anticipate in 1-2 days    Subjective:     Chief Complaint: Fatigue (Pt arrived via [facility] from [facility]. Per EMS, they were called for pt \"responding slow\" and LKW was yesterday.  in EMS, no hx of diabetes. Alert only to self in triage and BS reading \"HIGH\" )     Interval history  Patient alert.  Oriented to self, location, but not to time.  Denies fever/chills, N/V, abdominal pain or diarrhea.  Patient denies much appetite but is open to eating    Review of Systems:    Review of Systems    10 point ROS negative except as stated above in \"subjective\" section    Objective:     Intake/Output Summary (Last 24 hours) at 2025 0911  Last data filed at 2025 0823  Gross per 24 hour   Intake 5007.57 ml   Output --   Net 5007.57 ml        Vitals:   Vitals:    25 0853   BP:    Pulse: 57   Resp:    Temp:    SpO2:        Physical Exam:     General: NAD  Cardiovascular: Regular

## 2025-06-25 NOTE — PROGRESS NOTES
4 Eyes Skin Assessment     NAME:  Francisco Cotton  YOB: 1950  MEDICAL RECORD NUMBER:  [id number]    The patient is being assessed for  Admission    I agree that at least one RN has performed a thorough Head to Toe Skin Assessment on the patient. ALL assessment sites listed below have been assessed.      Areas assessed by both nurses:    Head, Face, Ears, Shoulders, Back, Chest, Arms, Elbows, Hands, Sacrum. Buttock, Coccyx, Ischium, Legs. Feet and Heels, and Under Medical Devices         Does the Patient have a Wound? No noted wound(s)       Nasir Prevention initiated by RN: Yes  Wound Care Orders initiated by RN: No    Pressure Injury (Stage 3,4, Unstageable, DTI, NWPT, and Complex wounds) if present, place Wound referral order by RN under : No    New Ostomies, if present place, Ostomy referral order under : No     Nurse 1 eSignature: Electronically signed by Alaina Chacko RN on 6/25/25 at 7:28 PM EDT    **SHARE this note so that the co-signing nurse can place an eSignature**    Nurse 2 eSignature: {Esignature:[id number]}

## 2025-06-25 NOTE — CARE COORDINATION
Case Management Assessment  Initial Evaluation    Date/Time of Evaluation: 6/25/2025 5:07 PM  Assessment Completed by: John Paul Morris RN    If patient is discharged prior to next notation, then this note serves as note for discharge by case management.    Patient Name: Francisco Cotton                   YOB: 1950  Diagnosis: MICHEL (acute kidney injury) [N17.9]  Hyperosmolar hyperglycemic state (HHS) (HCC) [E11.00]                   Date / Time: 6/24/2025  2:40 PM    Patient Admission Status: Inpatient   Readmission Risk (Low < 19, Mod (19-27), High > 27): Readmission Risk Score: 11.7    Current PCP: No primary care provider on file.  PCP verified by CM? Yes (Facility Physician)    Chart Reviewed: Yes      History Provided by: Child/Family, Medical Record  Patient Orientation: Alert and Oriented, Person    Patient Cognition: Alert    Hospitalization in the last 30 days (Readmission):  Yes    If yes, Readmission Assessment in CM Navigator will be completed.    Advance Directives:      Code Status: Full Code   Patient's Primary Decision Maker is: Legal Next of Kin    Primary Decision Maker: Francisco Cotton Jr - Child - [phone]    Discharge Planning:    Patient lives with: Other (Comment) (LTC Facility) Type of Home: Long-Term Care  Primary Care Giver: Other (Comment) (Facility staff)  Patient Support Systems include: Family Members   Current Financial resources: Medicaid  Current community resources: ECF/Home Care (LTC)  Current services prior to admission: Extended Care Facility            Current DME:              Type of Home Care services:  None    ADLS  Prior functional level: Assistance with the following:, Bathing, Toileting, Cooking, Housework, Shopping, Mobility, Dressing  Current functional level: Assistance with the following:, Bathing, Dressing, Toileting, Cooking, Housework, Shopping, Mobility    PT AM-PAC:   /24  OT AM-PAC:   /24    Family can provide assistance at DC: No  Would you like

## 2025-06-25 NOTE — PROGRESS NOTES
[facility]  Hyperglycemia Event      NAME: Francisco OrtizCotton  MEDICAL RECORD NUMBER:  [id number]  AGE: 75 y.o.   GENDER: male  : 1950  EPISODE DATE:  2025     Data     Recent Labs     25  0307 25  0407 25  0508 25  0612 25  0820 25  0942   POCGLU 187* 231* 190* 210* 244* 281*       HgBA1c:    Lab Results   Component Value Date/Time    LABA1C 10.8 2025 05:20 PM       BUN/Creatinine:    Lab Results   Component Value Date/Time    BUN 34 2025 07:29 AM    CREATININE 1.2 2025 07:29 AM     GFR Estimated Creatinine Clearance: 51 mL/min (based on SCr of 1.2 mg/dL).    Medications  Scheduled Medications:   insulin glargine  0.25 Units/kg SubCUTAneous Daily    insulin lispro  0.08 Units/kg SubCUTAneous TID WC    insulin lispro  0-4 Units SubCUTAneous 4x Daily AC & HS    [Held by provider] lactulose  30 g Oral TID    [Held by provider] hydroCHLOROthiazide  25 mg Oral Daily    famotidine  10 mg Oral BID    sertraline  25 mg Oral Daily    enoxaparin  40 mg SubCUTAneous Daily    galantamine  4 mg Oral BID WC       Diet  Current diet/supplement order: ADULT DIET; Regular; 4 carb choices (60 gm/meal)     Recorded PO:   last meal in flowsheets      Action      Francisco wakes briefly to name.  Diabetes self management delayed at this time.Diabetes content added to the Education Plan.           Electronically signed by Joceline Salvador RN on 2025 at 10:26 AM

## 2025-06-25 NOTE — PROGRESS NOTES
Patient has not voided since admission to ICU. Urinal at bedside, pt encouraged by RN  to void. Bladder scan shows 216 mL at this time.

## 2025-06-25 NOTE — PLAN OF CARE
Problem: Chronic Conditions and Co-morbidities  Goal: Patient's chronic conditions and co-morbidity symptoms are monitored and maintained or improved  Outcome: Progressing  Flowsheets (Taken 6/25/2025 0853)  Care Plan - Patient's Chronic Conditions and Co-Morbidity Symptoms are Monitored and Maintained or Improved: Monitor and assess patient's chronic conditions and comorbid symptoms for stability, deterioration, or improvement     Problem: Discharge Planning  Goal: Discharge to home or other facility with appropriate resources  Outcome: Progressing  Flowsheets (Taken 6/25/2025 0853)  Discharge to home or other facility with appropriate resources:   Identify barriers to discharge with patient and caregiver   Arrange for needed discharge resources and transportation as appropriate     Problem: Pain  Goal: Verbalizes/displays adequate comfort level or baseline comfort level  Outcome: Progressing  Flowsheets (Taken 6/25/2025 0823)  Verbalizes/displays adequate comfort level or baseline comfort level:   Encourage patient to monitor pain and request assistance   Assess pain using appropriate pain scale   Administer analgesics based on type and severity of pain and evaluate response     Problem: Skin/Tissue Integrity  Goal: Skin integrity remains intact  Description: 1.  Monitor for areas of redness and/or skin breakdown  2.  Assess vascular access sites hourly  3.  Every 4-6 hours minimum:  Change oxygen saturation probe site  4.  Every 4-6 hours:  If on nasal continuous positive airway pressure, respiratory therapy assess nares and determine need for appliance change or resting period  Outcome: Progressing  Flowsheets (Taken 6/25/2025 0853)  Skin Integrity Remains Intact:   Monitor for areas of redness and/or skin breakdown   Assess vascular access sites hourly   Every 4-6 hours minimum:  Change oxygen saturation probe site   Every 4-6 hours:  If on nasal continuous positive airway pressure, assess nares and determine

## 2025-06-26 VITALS
RESPIRATION RATE: 17 BRPM | SYSTOLIC BLOOD PRESSURE: 129 MMHG | HEART RATE: 64 BPM | BODY MASS INDEX: 22.22 KG/M2 | DIASTOLIC BLOOD PRESSURE: 80 MMHG | TEMPERATURE: 98.2 F | WEIGHT: 164.02 LBS | HEIGHT: 72 IN | OXYGEN SATURATION: 97 %

## 2025-06-26 LAB
ANION GAP SERPL CALCULATED.3IONS-SCNC: 11 MMOL/L (ref 3–16)
BUN SERPL-MCNC: 21 MG/DL (ref 7–20)
CALCIUM SERPL-MCNC: 8.8 MG/DL (ref 8.3–10.6)
CHLORIDE SERPL-SCNC: 112 MMOL/L (ref 99–110)
CO2 SERPL-SCNC: 22 MMOL/L (ref 21–32)
CREAT SERPL-MCNC: 1 MG/DL (ref 0.8–1.3)
GFR SERPLBLD CREATININE-BSD FMLA CKD-EPI: 78 ML/MIN/{1.73_M2}
GLUCOSE BLD-MCNC: 130 MG/DL (ref 70–99)
GLUCOSE BLD-MCNC: 179 MG/DL (ref 70–99)
GLUCOSE BLD-MCNC: 222 MG/DL (ref 70–99)
GLUCOSE SERPL-MCNC: 236 MG/DL (ref 70–99)
MAGNESIUM SERPL-MCNC: 2.31 MG/DL (ref 1.8–2.4)
PERFORMED ON: ABNORMAL
PHOSPHATE SERPL-MCNC: 2.3 MG/DL (ref 2.5–4.9)
POTASSIUM SERPL-SCNC: 4.3 MMOL/L (ref 3.5–5.1)
SODIUM SERPL-SCNC: 145 MMOL/L (ref 136–145)

## 2025-06-26 PROCEDURE — 36415 COLL VENOUS BLD VENIPUNCTURE: CPT

## 2025-06-26 PROCEDURE — 97166 OT EVAL MOD COMPLEX 45 MIN: CPT

## 2025-06-26 PROCEDURE — 97530 THERAPEUTIC ACTIVITIES: CPT

## 2025-06-26 PROCEDURE — 83735 ASSAY OF MAGNESIUM: CPT

## 2025-06-26 PROCEDURE — 97162 PT EVAL MOD COMPLEX 30 MIN: CPT

## 2025-06-26 PROCEDURE — 94760 N-INVAS EAR/PLS OXIMETRY 1: CPT

## 2025-06-26 PROCEDURE — 6370000000 HC RX 637 (ALT 250 FOR IP): Performed by: STUDENT IN AN ORGANIZED HEALTH CARE EDUCATION/TRAINING PROGRAM

## 2025-06-26 PROCEDURE — 6370000000 HC RX 637 (ALT 250 FOR IP): Performed by: REGISTERED NURSE

## 2025-06-26 PROCEDURE — 6360000002 HC RX W HCPCS: Performed by: INTERNAL MEDICINE

## 2025-06-26 PROCEDURE — 80048 BASIC METABOLIC PNL TOTAL CA: CPT

## 2025-06-26 PROCEDURE — 6370000000 HC RX 637 (ALT 250 FOR IP): Performed by: INTERNAL MEDICINE

## 2025-06-26 PROCEDURE — 6360000002 HC RX W HCPCS: Performed by: REGISTERED NURSE

## 2025-06-26 PROCEDURE — 84100 ASSAY OF PHOSPHORUS: CPT

## 2025-06-26 RX ORDER — INSULIN GLARGINE 100 [IU]/ML
20 INJECTION, SOLUTION SUBCUTANEOUS DAILY
Status: DISCONTINUED | OUTPATIENT
Start: 2025-06-27 | End: 2025-06-26 | Stop reason: HOSPADM

## 2025-06-26 RX ORDER — OLANZAPINE 10 MG/2ML
5 INJECTION, POWDER, FOR SOLUTION INTRAMUSCULAR ONCE
Status: COMPLETED | OUTPATIENT
Start: 2025-06-26 | End: 2025-06-26

## 2025-06-26 RX ORDER — OLANZAPINE 5 MG/1
5 TABLET, ORALLY DISINTEGRATING ORAL ONCE
Status: DISCONTINUED | OUTPATIENT
Start: 2025-06-26 | End: 2025-06-26

## 2025-06-26 RX ORDER — DIPHENHYDRAMINE HYDROCHLORIDE 25 MG/1
1 CAPSULE, LIQUID FILLED ORAL 3 TIMES DAILY
Qty: 1 KIT | DISCHARGE
Start: 2025-06-26

## 2025-06-26 RX ORDER — OLANZAPINE 5 MG/1
5 TABLET, ORALLY DISINTEGRATING ORAL ONCE
Status: COMPLETED | OUTPATIENT
Start: 2025-06-26 | End: 2025-06-26

## 2025-06-26 RX ORDER — METFORMIN HYDROCHLORIDE 500 MG/1
500 TABLET, EXTENDED RELEASE ORAL
DISCHARGE
Start: 2025-06-26

## 2025-06-26 RX ORDER — GLUCOSAMINE HCL/CHONDROITIN SU 500-400 MG
CAPSULE ORAL 3 TIMES DAILY
DISCHARGE
Start: 2025-06-26 | End: 2025-07-26

## 2025-06-26 RX ADMIN — INSULIN LISPRO 1 UNITS: 100 INJECTION, SOLUTION INTRAVENOUS; SUBCUTANEOUS at 08:17

## 2025-06-26 RX ADMIN — INSULIN GLARGINE 17 UNITS: 100 INJECTION, SOLUTION SUBCUTANEOUS at 08:18

## 2025-06-26 RX ADMIN — ENOXAPARIN SODIUM 40 MG: 100 INJECTION SUBCUTANEOUS at 08:04

## 2025-06-26 RX ADMIN — SERTRALINE 25 MG: 25 TABLET, FILM COATED ORAL at 08:04

## 2025-06-26 RX ADMIN — OLANZAPINE 5 MG: 10 INJECTION, POWDER, FOR SOLUTION INTRAMUSCULAR at 02:33

## 2025-06-26 RX ADMIN — FAMOTIDINE 10 MG: 20 TABLET, FILM COATED ORAL at 08:04

## 2025-06-26 RX ADMIN — INSULIN LISPRO 5 UNITS: 100 INJECTION, SOLUTION INTRAVENOUS; SUBCUTANEOUS at 13:40

## 2025-06-26 RX ADMIN — INSULIN LISPRO 5 UNITS: 100 INJECTION, SOLUTION INTRAVENOUS; SUBCUTANEOUS at 08:19

## 2025-06-26 RX ADMIN — GALANTAMINE 4 MG: 4 TABLET, FILM COATED ORAL at 08:18

## 2025-06-26 NOTE — PROGRESS NOTES
Patient is refusing tele monitor, lab and vitals sign secured message send to Dr fanny Alvarez. No any active order.

## 2025-06-26 NOTE — PROGRESS NOTES
[facility] - Physical Therapy   Phone: [phone]    Physical Therapy  Facility/Department:[address] MED SURG    [x] Initial Evaluation            [] Daily Treatment Note         [x] Discharge Summary      Patient: Francisco Cotton   : 1950   MRN: [id number]   Date of Service:  2025  Staff Mobility Recommendation: CGA.    AM-PAC score:   Discharge Recommendations: LTC without PT.    Admitting Diagnosis: Hyperosmolar hyperglycemic state (HHS) (HCC)  Ordering Physician: Dr Bajwa  Current Admission Summary: 76 y/o male admit from LTC with Hyperosmolar Hyperglycemic State; MICHEL, AMS.  CT Head : negative.     Past Medical History:  has a past medical history of Alcohol dependence with alcohol-induced persisting dementia (HCC), Alcoholic cirrhosis of liver with ascites (HCC), Alcoholic hepatitis without ascites (HCC), Anemia, Anxiety, Chronic hepatitis in viral disease (HCC), Cognitive communication deficit, Coordination abnormal, Dementia (HCC), GERD (gastroesophageal reflux disease), Hypertension, and Malnutrition.  Past Surgical History:  has no past surgical history on file.    Assessment  Activity Tolerance: Fair  Impairments Requiring Therapeutic Intervention: decreased cognition  Prognosis: fair; limited due to cognition.    Clinical Assessment: 76 y/o male admit from LTC with Hyperosmolar Hyperglycemic State; MICHEL, AMS.  CT Head : negative  DME Required For Discharge: DME to be determined at next level of care        Restrictions:  Precautions/Restrictions: medium fall risk, cognition.   Weight Bearing Restrictions: no restrictions    Required Braces/Orthotics: no braces required  Positional Restrictions:no positional restrictions    Subjective  General: Pt confused; murray PT Eval.  Limited due to cognition.   Family/Caregiver present: No  Pain: Patient reports no pain  Pain Interventions: not applicable    Home Environment / Functional History  Type of Home: Facility ([facility]

## 2025-06-26 NOTE — DISCHARGE SUMMARY
V2.0  Discharge Summary    Name:  Francisco Cotton /Age/Sex: 1950 (75 y.o. male)   Admit Date: 2025  Discharge Date: 25    MRN & CSN:  [id number] & [id number] Encounter Date and Time 25 7:14 PM EDT    Attending:  No att. providers found Discharging Provider: Elliott Bajwa MD       Hospital Course:     Brief HPI: Francisco Cotton is a 75 y.o. male who presented with altered mental status    Brief Problem Based Course:   HHS in setting of apparently new diagnosis type 2 diabetes mellitus: Patient in the hospital with altered mental status.  Patient reportedly responding slower than baseline.  Blood sugar was 511 with EMS.  Patient was treated with insulin and IV fluids with improvement of his blood sugars and his mental status.  A1c was 10.8.  Patient was discharged on combination of insulin and metformin.  Due to patient's dementia, do not feel he can be reliably counted on to report symptoms of hypoglycemia and thus patient was recommended multiple blood glucose checks per day at his long-term care facility to monitor for hypoglycemia.  Defer to patient's primary care provider at facility regarding adjustment of diabetic regimen  Acute metabolic encephalopathy in the setting of dementia: Due to HHS.  Patient had improvement in mental status with improvement of the blood sugars  MICHEL: Prerenal in the setting of HHS.  Resolved with fluid resuscitation      The patient expressed appropriate understanding of, and agreement with the discharge recommendations, medications, and plan.     Consults this admission:  IP CONSULT TO DIABETES EDUCATOR    Discharge Diagnosis:   Hyperosmolar hyperglycemic state (HHS) (HCC)  Type 2 diabetes mellitus  Acute metabolic cephalopathy  9 dementia  MICHEL    Discharge Instruction:   Follow up appointments:   Primary care physician: No primary care provider on file. within 2 weeks  Diet: diabetic diet   Activity: activity as tolerated  Disposition: Discharged to:   [x]

## 2025-06-26 NOTE — PROGRESS NOTES
Report called to 4W DALTON Lara, all questions answered. All belongings gathered and patient transferred to room 4123 via wheelchair. Transfer complete.

## 2025-06-26 NOTE — PROGRESS NOTES
Patient is confused and getting out of bed frequently. Patient is refusing tele monitor. Secured message to NP Chante Cradenas. Patient refused to take oral zyprexa awaiting for IM order.

## 2025-06-26 NOTE — DISCHARGE INSTR - COC
Continuity of Care Form    Patient Name: Francisco Cotton   :  1950  MRN:  [id number]    Admit date:  2025  Discharge date:      Code Status Order: Full Code   Advance Directives:     Admitting Physician:  Craig Bragg MD  PCP: No primary care provider on file.    Discharging Nurse: SATHYA Magana  Discharging Hospital Unit/Room#: A0A-7242/4123-01  Discharging Unit Phone Number: [phone]    Emergency Contact:   Extended Emergency Contact Information  Primary Emergency Contact: Francisco Cotton Jr  Home Phone: [phone]  Work Phone: [phone]  Relation: Child  Preferred language: English    Past Surgical History:  No past surgical history on file.    Immunization History:   Immunization History   Administered Date(s) Administered    COVID-19, PFIZER PURPLE top, DILUTE for use, (age 12 y+), 30mcg/0.3mL 2020       Active Problems:  Patient Active Problem List   Diagnosis Code    Change in mental status R41.82    COVID-19 U07.1    Hyperosmolar hyperglycemic state (HHS) ([facility]) E11.00       Isolation/Infection:   Isolation            No Isolation          Patient Infection Status    No active infections.   Resolved       Infection Onset Added Last Indicated Last Indicated By Resolved Resolved By    COVID-19 20 COVID-19 20 Infection                          Nurse Assessment:  Last Vital Signs: /68   Pulse 65   Temp 99 °F (37.2 °C) (Oral)   Resp 17   Ht 1.829 m (6')   Wt 74.4 kg (164 lb 0.4 oz)   SpO2 98%   BMI 22.25 kg/m²     Last documented pain score (0-10 scale): Pain Level: 0  Last Weight:   Wt Readings from Last 1 Encounters:   25 74.4 kg (164 lb 0.4 oz)     Mental Status:  disoriented    IV Access:  - None    Nursing Mobility/ADLs:  Walking   Dependent  Transfer  Dependent  Bathing  Dependent  Dressing  Dependent  Toileting  Dependent  Feeding  Dependent  Med Admin  Dependent  Med Delivery   whole    Wound Care Documentation and

## 2025-06-26 NOTE — PROGRESS NOTES
Occupational Therapy    [facility] - Occupational Therapy   Phone: [phone]    Occupational Therapy  Facility/Department:[address] MED SURG    [x] Initial Evaluation            [] Daily Treatment Note         [x] Discharge Summary      Patient: Francisco Cotton   : 1950   MRN: [id number]   Date of Service:  2025    Staff Mobility Recommendation: CGA    AM-PAC Score: 15/24  Discharge Recommendations: LT w/o therapy  Francisco Cotton scored a 15/24 on the AM-PAC ADL Inpatient form.       Admitting Diagnosis:  Hyperosmolar hyperglycemic state (HHS) (HCC)  Ordering Physician: Elliott Bajwa MD   Current Admission Summary: Per H&P \"75 y.o. male who presented to [facility] with generalized weakness with history of alcohol use, cirrhotic liver, on presentation found to be hyperglycemic encephalopathic, no fever chills no vomiting started on IV insulin patient being admitted for further management and treatment.\"    Past Medical History:  has a past medical history of Alcohol dependence with alcohol-induced persisting dementia (HCC), Alcoholic cirrhosis of liver with ascites (HCC), Alcoholic hepatitis without ascites (HCC), Anemia, Anxiety, Chronic hepatitis in viral disease (HCC), Cognitive communication deficit, Coordination abnormal, Dementia (HCC), GERD (gastroesophageal reflux disease), Hypertension, and Malnutrition.  Past Surgical History:  has no past surgical history on file.    Assessment  Activity Tolerance: Poor  Impairments Requiring Therapeutic Intervention: none - eval with same day discharge  Prognosis: poor    Clinical Assessment: Pt is a 75 y.o. M admitted for hyperosmolar hyperglycemic state. PTA, he is from [facility] where he reported amb w/o device, but does not answer questions regarding self care. Today- he required assist for bed mob 2/2 cognition, SBA fxl transfers/mob in the room w/o device. He declined all ADLs. Pt unable to redirected and not receptive to edu when asking to stay in

## 2025-06-26 NOTE — PROGRESS NOTES
Patient transfer from ICU To bed 4123. Patient is orient x2. VSS. Assisted patient need. Bed is locked and in lowest position. Bed alarm on.Patient is educated about room and call light.

## 2025-06-26 NOTE — CARE COORDINATION
DISCHARGE SUMMARY     DATE OF DISCHARGE: 6/26/25    DISCHARGE DESTINATION: LTC    FACILITY  Discharging to Facility/ Agency   Name: [facility]   Address[facility]  [address] [place], [place] [address] [place] [address]   Phone:  [phone]  Fax:  [phone]     Level of Care: Long Term    Report Number: [phone]    Fax Number:  [phone]    Precert Obtained: N/A    Hens Completed: N/A    PASARR: N/A    Notified: RN, Family, and Facility/Agency  Francisco Cotton Jr.    Prescriptions Faxed:no    HEMODIALYSIS: No    TRANSPORTATION: Stretcher    Company Name: [employer]     Time: 6:00 PM    Phone Number: [phone]    NEW DME ORDERED: no    Electronically signed by Huma Cortes RN on 6/26/2025 at 4:36 PM

## 2025-06-26 NOTE — PROGRESS NOTES
[facility]  Hyperglycemia Event      NAME: Francisco Cotton  MEDICAL RECORD NUMBER:  [id number]  AGE: 75 y.o.   GENDER: male  : 1950  EPISODE DATE:  2025     Data     Recent Labs     25  1158 25  1714 25  2155 25  2340 25  0809 25  1223   POCGLU 313* 275* 249* 237* 222* 179*       HgBA1c:    Lab Results   Component Value Date/Time    LABA1C 10.8 2025 05:20 PM       BUN/Creatinine:    Lab Results   Component Value Date/Time    BUN 21 2025 07:42 AM    CREATININE 1.0 2025 07:42 AM     GFR Estimated Creatinine Clearance: 67 mL/min (based on SCr of 1 mg/dL).    Medications  Scheduled Medications:   insulin glargine  0.25 Units/kg SubCUTAneous Daily    insulin lispro  0.08 Units/kg SubCUTAneous TID WC    insulin lispro  0-4 Units SubCUTAneous 4x Daily AC & HS    [Held by provider] lactulose  30 g Oral TID    [Held by provider] hydroCHLOROthiazide  25 mg Oral Daily    famotidine  10 mg Oral BID    sertraline  25 mg Oral Daily    enoxaparin  40 mg SubCUTAneous Daily    galantamine  4 mg Oral BID WC       Diet  Current diet/supplement order: ADULT DIET; Regular; 4 carb choices (60 gm/meal)     Recorded PO: PO Meals Eaten (%): 1 - 25% last meal in flowsheets      Action      Francisco reports that the food has been \"OK\".      He is not feeling like eating lunch at this time.      He states he feels shaky when his blood sugar is low.  He agrees to notify staff of any symptoms.      Recommend inpatient targets 100 - 180.      Trends improving.      Recommend continuing with current plan while appetite is limited.      Electronically signed by Joceline Salvador RN on 2025 at 12:29 PM

## 2025-06-26 NOTE — PROGRESS NOTES
4 Eyes Skin Assessment     NAME:  Francisco Cotton  YOB: 1950  MEDICAL RECORD NUMBER:  [id number]    The patient is being assessed for  Transfer to New Unit    I agree that at least one RN has performed a thorough Head to Toe Skin Assessment on the patient. ALL assessment sites listed below have been assessed.      Areas assessed by both nurses:    Head, Face, Ears, Shoulders, Back, Chest, Arms, Elbows, Hands, Sacrum. Buttock, Coccyx, Ischium, Legs. Feet and Heels, and Under Medical Devices         Does the Patient have a Wound? No noted wound(s)       Nasir Prevention initiated by RN: Yes  Wound Care Orders initiated by RN: No    Pressure Injury (Stage 3,4, Unstageable, DTI, NWPT, and Complex wounds) if present, place Wound referral order by RN under : No    New Ostomies, if present place, Ostomy referral order under : No     Nurse 1 eSignature: Electronically signed by Riaz Garsia RN on 6/25/25 at 11:28 PM EDT    **SHARE this note so that the co-signing nurse can place an eSignature**    Nurse 2 eSignature: Electronically signed by KHATIWADA,SWASTIKA, RN on 6/25/25 at 11:49 PM EDT